# Patient Record
Sex: MALE | Race: OTHER | HISPANIC OR LATINO | URBAN - METROPOLITAN AREA
[De-identification: names, ages, dates, MRNs, and addresses within clinical notes are randomized per-mention and may not be internally consistent; named-entity substitution may affect disease eponyms.]

---

## 2021-02-18 ENCOUNTER — INPATIENT (INPATIENT)
Facility: HOSPITAL | Age: 71
LOS: 3 days | Discharge: HOME | DRG: 841 | End: 2021-02-22
Attending: STUDENT IN AN ORGANIZED HEALTH CARE EDUCATION/TRAINING PROGRAM | Admitting: HOSPITALIST
Payer: MEDICARE

## 2021-02-18 VITALS
DIASTOLIC BLOOD PRESSURE: 90 MMHG | RESPIRATION RATE: 16 BRPM | TEMPERATURE: 98 F | HEART RATE: 75 BPM | OXYGEN SATURATION: 98 % | WEIGHT: 197.98 LBS | HEIGHT: 68 IN | SYSTOLIC BLOOD PRESSURE: 148 MMHG

## 2021-02-18 DIAGNOSIS — I10 ESSENTIAL (PRIMARY) HYPERTENSION: ICD-10-CM

## 2021-02-18 DIAGNOSIS — J98.59 OTHER DISEASES OF MEDIASTINUM, NOT ELSEWHERE CLASSIFIED: ICD-10-CM

## 2021-02-18 DIAGNOSIS — Z90.79 ACQUIRED ABSENCE OF OTHER GENITAL ORGAN(S): Chronic | ICD-10-CM

## 2021-02-18 DIAGNOSIS — D64.9 ANEMIA, UNSPECIFIED: ICD-10-CM

## 2021-02-18 DIAGNOSIS — I31.3 PERICARDIAL EFFUSION (NONINFLAMMATORY): ICD-10-CM

## 2021-02-18 DIAGNOSIS — E11.9 TYPE 2 DIABETES MELLITUS WITHOUT COMPLICATIONS: ICD-10-CM

## 2021-02-18 DIAGNOSIS — R63.8 OTHER SYMPTOMS AND SIGNS CONCERNING FOOD AND FLUID INTAKE: ICD-10-CM

## 2021-02-18 DIAGNOSIS — E78.00 PURE HYPERCHOLESTEROLEMIA, UNSPECIFIED: ICD-10-CM

## 2021-02-18 DIAGNOSIS — C61 MALIGNANT NEOPLASM OF PROSTATE: ICD-10-CM

## 2021-02-18 LAB
ALBUMIN SERPL ELPH-MCNC: 3.8 G/DL — SIGNIFICANT CHANGE UP (ref 3.3–5)
ALP SERPL-CCNC: 61 U/L — SIGNIFICANT CHANGE UP (ref 40–120)
ALT FLD-CCNC: 10 U/L — SIGNIFICANT CHANGE UP (ref 10–45)
ANION GAP SERPL CALC-SCNC: 14 MMOL/L — SIGNIFICANT CHANGE UP (ref 5–17)
APTT BLD: 28.8 SEC — SIGNIFICANT CHANGE UP (ref 27.5–35.5)
AST SERPL-CCNC: 12 U/L — SIGNIFICANT CHANGE UP (ref 10–40)
BASOPHILS # BLD AUTO: 0.04 K/UL — SIGNIFICANT CHANGE UP (ref 0–0.2)
BASOPHILS NFR BLD AUTO: 0.5 % — SIGNIFICANT CHANGE UP (ref 0–2)
BILIRUB SERPL-MCNC: 0.2 MG/DL — SIGNIFICANT CHANGE UP (ref 0.2–1.2)
BUN SERPL-MCNC: 14 MG/DL — SIGNIFICANT CHANGE UP (ref 7–23)
CALCIUM SERPL-MCNC: 9.3 MG/DL — SIGNIFICANT CHANGE UP (ref 8.4–10.5)
CHLORIDE SERPL-SCNC: 109 MMOL/L — HIGH (ref 96–108)
CO2 SERPL-SCNC: 21 MMOL/L — LOW (ref 22–31)
CREAT SERPL-MCNC: 0.76 MG/DL — SIGNIFICANT CHANGE UP (ref 0.5–1.3)
EOSINOPHIL # BLD AUTO: 0.83 K/UL — HIGH (ref 0–0.5)
EOSINOPHIL NFR BLD AUTO: 10.1 % — HIGH (ref 0–6)
GLUCOSE BLDC GLUCOMTR-MCNC: 93 MG/DL — SIGNIFICANT CHANGE UP (ref 70–99)
GLUCOSE SERPL-MCNC: 80 MG/DL — SIGNIFICANT CHANGE UP (ref 70–99)
HCT VFR BLD CALC: 33.7 % — LOW (ref 39–50)
HGB BLD-MCNC: 10.7 G/DL — LOW (ref 13–17)
IMM GRANULOCYTES NFR BLD AUTO: 0.6 % — SIGNIFICANT CHANGE UP (ref 0–1.5)
INR BLD: 1.21 — HIGH (ref 0.88–1.16)
LYMPHOCYTES # BLD AUTO: 1.05 K/UL — SIGNIFICANT CHANGE UP (ref 1–3.3)
LYMPHOCYTES # BLD AUTO: 12.7 % — LOW (ref 13–44)
MCHC RBC-ENTMCNC: 26.7 PG — LOW (ref 27–34)
MCHC RBC-ENTMCNC: 31.8 GM/DL — LOW (ref 32–36)
MCV RBC AUTO: 84 FL — SIGNIFICANT CHANGE UP (ref 80–100)
MONOCYTES # BLD AUTO: 0.82 K/UL — SIGNIFICANT CHANGE UP (ref 0–0.9)
MONOCYTES NFR BLD AUTO: 10 % — SIGNIFICANT CHANGE UP (ref 2–14)
NEUTROPHILS # BLD AUTO: 5.45 K/UL — SIGNIFICANT CHANGE UP (ref 1.8–7.4)
NEUTROPHILS NFR BLD AUTO: 66.1 % — SIGNIFICANT CHANGE UP (ref 43–77)
NRBC # BLD: 0 /100 WBCS — SIGNIFICANT CHANGE UP (ref 0–0)
PLATELET # BLD AUTO: 370 K/UL — SIGNIFICANT CHANGE UP (ref 150–400)
POTASSIUM SERPL-MCNC: 4 MMOL/L — SIGNIFICANT CHANGE UP (ref 3.5–5.3)
POTASSIUM SERPL-SCNC: 4 MMOL/L — SIGNIFICANT CHANGE UP (ref 3.5–5.3)
PROT SERPL-MCNC: 7.2 G/DL — SIGNIFICANT CHANGE UP (ref 6–8.3)
PROTHROM AB SERPL-ACNC: 14.4 SEC — HIGH (ref 10.6–13.6)
RBC # BLD: 4.01 M/UL — LOW (ref 4.2–5.8)
RBC # FLD: 16.7 % — HIGH (ref 10.3–14.5)
SARS-COV-2 RNA SPEC QL NAA+PROBE: SIGNIFICANT CHANGE UP
SODIUM SERPL-SCNC: 144 MMOL/L — SIGNIFICANT CHANGE UP (ref 135–145)
TROPONIN T SERPL-MCNC: <0.01 NG/ML — SIGNIFICANT CHANGE UP (ref 0–0.01)
WBC # BLD: 8.24 K/UL — SIGNIFICANT CHANGE UP (ref 3.8–10.5)
WBC # FLD AUTO: 8.24 K/UL — SIGNIFICANT CHANGE UP (ref 3.8–10.5)

## 2021-02-18 PROCEDURE — G1004: CPT

## 2021-02-18 PROCEDURE — 93010 ELECTROCARDIOGRAM REPORT: CPT

## 2021-02-18 PROCEDURE — 71045 X-RAY EXAM CHEST 1 VIEW: CPT | Mod: 26

## 2021-02-18 PROCEDURE — 71260 CT THORAX DX C+: CPT | Mod: 26,ME

## 2021-02-18 PROCEDURE — 75574 CT ANGIO HRT W/3D IMAGE: CPT | Mod: 26,ME

## 2021-02-18 PROCEDURE — 99223 1ST HOSP IP/OBS HIGH 75: CPT | Mod: GC

## 2021-02-18 PROCEDURE — 99285 EMERGENCY DEPT VISIT HI MDM: CPT | Mod: 25

## 2021-02-18 RX ORDER — ACETAMINOPHEN 500 MG
650 TABLET ORAL EVERY 6 HOURS
Refills: 0 | Status: DISCONTINUED | OUTPATIENT
Start: 2021-02-18 | End: 2021-02-22

## 2021-02-18 RX ORDER — OXYCODONE HYDROCHLORIDE 5 MG/1
5 TABLET ORAL EVERY 4 HOURS
Refills: 0 | Status: DISCONTINUED | OUTPATIENT
Start: 2021-02-18 | End: 2021-02-22

## 2021-02-18 RX ORDER — HYDROMORPHONE HYDROCHLORIDE 2 MG/ML
1 INJECTION INTRAMUSCULAR; INTRAVENOUS; SUBCUTANEOUS EVERY 4 HOURS
Refills: 0 | Status: DISCONTINUED | OUTPATIENT
Start: 2021-02-18 | End: 2021-02-18

## 2021-02-18 RX ORDER — ASPIRIN/CALCIUM CARB/MAGNESIUM 324 MG
162 TABLET ORAL ONCE
Refills: 0 | Status: DISCONTINUED | OUTPATIENT
Start: 2021-02-18 | End: 2021-02-18

## 2021-02-18 RX ORDER — INSULIN LISPRO 100/ML
VIAL (ML) SUBCUTANEOUS
Refills: 0 | Status: DISCONTINUED | OUTPATIENT
Start: 2021-02-18 | End: 2021-02-22

## 2021-02-18 RX ORDER — ATORVASTATIN CALCIUM 80 MG/1
20 TABLET, FILM COATED ORAL AT BEDTIME
Refills: 0 | Status: DISCONTINUED | OUTPATIENT
Start: 2021-02-18 | End: 2021-02-22

## 2021-02-18 RX ORDER — FLUTICASONE PROPIONATE 50 MCG
1 SPRAY, SUSPENSION NASAL
Refills: 0 | Status: DISCONTINUED | OUTPATIENT
Start: 2021-02-18 | End: 2021-02-22

## 2021-02-18 RX ORDER — METOPROLOL TARTRATE 50 MG
50 TABLET ORAL ONCE
Refills: 0 | Status: DISCONTINUED | OUTPATIENT
Start: 2021-02-18 | End: 2021-02-18

## 2021-02-18 RX ORDER — OXYCODONE HYDROCHLORIDE 5 MG/1
10 TABLET ORAL EVERY 6 HOURS
Refills: 0 | Status: DISCONTINUED | OUTPATIENT
Start: 2021-02-18 | End: 2021-02-22

## 2021-02-18 RX ORDER — FAMOTIDINE 10 MG/ML
20 INJECTION INTRAVENOUS ONCE
Refills: 0 | Status: COMPLETED | OUTPATIENT
Start: 2021-02-18 | End: 2021-02-18

## 2021-02-18 RX ORDER — ENOXAPARIN SODIUM 100 MG/ML
40 INJECTION SUBCUTANEOUS AT BEDTIME
Refills: 0 | Status: DISCONTINUED | OUTPATIENT
Start: 2021-02-19 | End: 2021-02-22

## 2021-02-18 RX ORDER — DEXTROSE 50 % IN WATER 50 %
25 SYRINGE (ML) INTRAVENOUS ONCE
Refills: 0 | Status: DISCONTINUED | OUTPATIENT
Start: 2021-02-18 | End: 2021-02-22

## 2021-02-18 RX ORDER — ACETAMINOPHEN 500 MG
650 TABLET ORAL EVERY 6 HOURS
Refills: 0 | Status: DISCONTINUED | OUTPATIENT
Start: 2021-02-18 | End: 2021-02-18

## 2021-02-18 RX ADMIN — ATORVASTATIN CALCIUM 20 MILLIGRAM(S): 80 TABLET, FILM COATED ORAL at 21:49

## 2021-02-18 RX ADMIN — Medication 30 MILLILITER(S): at 14:51

## 2021-02-18 RX ADMIN — FAMOTIDINE 20 MILLIGRAM(S): 10 INJECTION INTRAVENOUS at 14:51

## 2021-02-18 NOTE — ED ADULT NURSE NOTE - OBJECTIVE STATEMENT
Pt AOX4. Pt c/o intermittent midsternal chest pain for 2 and a half weeks. Pt states "this morning I woke up and the chest pain felt different it feels sharp and burning". Pt denies SOB, fevers, chills, n/v, weakness, numbness. Pt speaking in full complete sentences. Respirations even and unlabored.

## 2021-02-18 NOTE — H&P ADULT - ASSESSMENT
Mr. Rosen is a 71 year old male with a past medical history of DM, HTN, HLD, Prostate Ca who presents with chest pain. He was admitted for work up of mediastinal mass.

## 2021-02-18 NOTE — H&P ADULT - PROBLEM SELECTOR PLAN 1
2 week history of pain without relief. CXR showed widened mediastinum. CT chest showed a large mass encasing the jose, right mainstem bronchus and abutting mediastinal vessels and mild to moderate pericardial effusion 2 week history of pain without relief. CXR showed widened mediastinum. CT chest showed a large mass encasing the jose, right mainstem bronchus and abutting mediastinal vessels and mild to moderate pericardial effusion  - Pulmonology made aware of mediastinal mass and plans to asses in AM  - Pending Bronchoscopy and biopsy on mass this admission  - Monitor for signs of cardiac tamponade. Currently HD stable  - F/U Pulmonology recommendations 2 week history of pain without relief. EKG NSR. Troponin negative. CXR showed widened mediastinum. CT chest showed a large mass encasing the jose, right mainstem bronchus and abutting mediastinal vessels and mild to moderate pericardial effusion  - Pulmonology made aware of mediastinal mass and plans to asses in AM  - Pending Bronchoscopy and biopsy on mass this admission  - Monitor for signs of cardiac tamponade. Currently HD stable  - F/U Pulmonology recommendations 2 week history of pain without relief. EKG NSR. Troponin negative. CXR showed widened mediastinum. CT chest showed a large mass encasing the jose, right mainstem bronchus and abutting mediastinal vessels and mild to moderate pericardial effusion  - Pulmonology made aware of mediastinal mass and plans to asses in AM  - Pending Bronchoscopy and biopsy on mass this admission. Need histologic differentiation to guide treatment course  - May require CT surgery evaluation for mass  - Monitor for signs of cardiac tamponade. Currently HD stable  - F/U Pulmonology recommendations

## 2021-02-18 NOTE — ED ADULT NURSE NOTE - MUSCULOSKELETAL ASSESSMENT
requesting medication refill. Please approve or deny this request.    Rx requested:  Requested Prescriptions     Pending Prescriptions Disp Refills    nitroGLYCERIN (NITROSTAT) 0.4 MG SL tablet [Pharmacy Med Name: Nitroglycerin 0.4 MG Sublingual Tablet Sublingual] 25 tablet 0     Sig: DISSOLVE ONE TABLET UNDER THE TONGUE EVERY 5 MINUTES AS NEEDED FOR CHEST PAIN. DO NOT EXCEED A TOTAL OF 3 DOSES IN 15 MINUTES         Last Office Visit:   9/19/2019      Next Visit Date:  No future appointments.
- - -

## 2021-02-18 NOTE — H&P ADULT - PROBLEM SELECTOR PLAN 4
Hgb 10.7 on initial CBC, unknown baseline Hgb. Denies any dark or bloody bowel movements  - F/U Iron studies in AM  - Continue to monitor, maintain active T & S Takes Metformin 500 mg BID  - SSI while inpatient  - F/U A1c in AM

## 2021-02-18 NOTE — ED PROVIDER NOTE - CLINICAL SUMMARY MEDICAL DECISION MAKING FREE TEXT BOX
here with substernal chest discomfort for 2 weeks. non exertional, no associated symptoms. no fever. ecg nsr. given age/risk factors, sent for cta coronaries to r/o cardiac disease and ct chest ordered to eval abnormal mediastinal contour on cxr. no significant cad noted on cardiac scan but large mediastinal soft tissue mass seen on ct chest. pulmonary consulted, scan reviewed by fellow Dr. Chung. plan admit to medicine to facilitate workup/ possible biopsy, eval by [facility], possible ct surgery if needed. pt clinically stable without respiratory symptoms.

## 2021-02-18 NOTE — H&P ADULT - NSICDXPASTMEDICALHX_GEN_ALL_CORE_FT
PAST MEDICAL HISTORY:  Diabetes     High cholesterol     HTN (hypertension)     Prostate cancer

## 2021-02-18 NOTE — H&P ADULT - NSICDXFAMILYHX_GEN_ALL_CORE_FT
FAMILY HISTORY:  Family history of diabetes mellitus (DM)  Family history of hyperlipidemia  FH: HTN (hypertension)

## 2021-02-18 NOTE — H&P ADULT - PROBLEM SELECTOR PLAN 8
F: None  E: Replete PRN  N: F: None  E: Replete PRN  N: DASH/CC    DVT: Lovenox  GI: None ADDENDUM: pt deferred nicotine patch o/n

## 2021-02-18 NOTE — H&P ADULT - PROBLEM SELECTOR PLAN 5
Continue home Lipitor 20 mg qhs Hgb 10.7 on initial CBC, unknown baseline Hgb. Denies any dark or bloody bowel movements  - F/U Iron studies in AM  - Continue to monitor, maintain active T & S

## 2021-02-18 NOTE — H&P ADULT - PROBLEM SELECTOR PLAN 3
History of Prostatectomy in ~2016 with a "little" chemotherapy. Denies radiation treatment.  - History of malignancy in setting of pericardial effusion and mediastinal mass raises concern for recurrence of cancer  - Obtain collateral on treatment from PCP, Dr. Peres

## 2021-02-18 NOTE — H&P ADULT - ATTENDING COMMENTS
reviewed pertinent data , h&p  patient seen and examined overnight   PE findings as above, in NAD looks younger than stated age. s1s2, looks younger than stated age  1. Mediastinal mass: followup pulm recs, NPO o/n pending evaluation   2. followup TTE, no elecrical alternans on ekg, cardiology consult pending TTE       rest of  plan as above

## 2021-02-18 NOTE — H&P ADULT - NSHPPHYSICALEXAM_GEN_ALL_CORE
VITAL SIGNS:  T(C): 36.9 (02-18-21 @ 19:11), Max: 37.2 (02-18-21 @ 15:59)  T(F): 98.5 (02-18-21 @ 19:11), Max: 98.9 (02-18-21 @ 15:59)  HR: 58 (02-18-21 @ 19:11) (57 - 86)  BP: 116/62 (02-18-21 @ 19:11) (101/57 - 160/89)  RR: 17 (02-18-21 @ 19:11) (16 - 18)  SpO2: 98% (02-18-21 @ 19:11) (97% - 99%)    PHYSICAL EXAM:    Constitutional: WDWN, lying comfortably in bed, NAD  HEENT: NCAT, no LAD, no JVD, EOMI  Respiratory: CTAB, no increased work of breathing  Cardiac: RRR, normal S1/S2, no murmurs appreciated   Gastrointestinal: soft, non-tender, non-distended, no rebound/guarding, no palpable masses, normoactive bowel sounds x4  Extremities: WWP, no clubbing or cyanosis, no peripheral edema  Musculoskeletal: NROM x4; no joint swelling, tenderness or erythema  Vascular: 2+ radial and DP pulses b/l  Dermatologic: skin warm, dry and intact, no rashes, wounds, or scars  Lymphatic: no submandibular or cervical LAD  Neurologic: AAOx3, CNII-XII grossly intact, no focal deficits  Psychiatric: affect and characteristics of appearance, verbalizations, behaviors are appropriate

## 2021-02-18 NOTE — H&P ADULT - NSHPSOCIALHISTORY_GEN_ALL_CORE
Former alcohol use, 25 pack year smoking history, denies illicit drug use. Lives at home with his wife. Retired government worker.

## 2021-02-18 NOTE — H&P ADULT - NSHPLABSRESULTS_GEN_ALL_CORE
LABS:                         10.7   8.24  )-----------( 370      ( 18 Feb 2021 14:35 )             33.7     02-18    144  |  109<H>  |  14  ----------------------------<  80  4.0   |  21<L>  |  0.76    Ca    9.3      18 Feb 2021 14:35    TPro  7.2  /  Alb  3.8  /  TBili  0.2  /  DBili  x   /  AST  12  /  ALT  10  /  AlkPhos  61  02-18    PT/INR - ( 18 Feb 2021 14:35 )   PT: 14.4 sec;   INR: 1.21          PTT - ( 18 Feb 2021 14:35 )  PTT:28.8 sec    CARDIAC MARKERS ( 18 Feb 2021 14:35 )  x     / <0.01 ng/mL / x     / x     / x                RADIOLOGY, EKG & ADDITIONAL TESTS: Reviewed.

## 2021-02-18 NOTE — ED PROVIDER NOTE - OBJECTIVE STATEMENT
history of dm, htn, high cholesterol, prostate cancer here with intermittent chest pain for past 2 weeks. Substernal, non radiating. Unable to describe quality other than saying it feels like heartburn but isn't heartburn because he tried otc medicine without relief. Non exertional, denies sob, fever/chills, nausea, vomiting. Prior smoker. Went to his pmd clinic today and referred to ED for further treatment. Given asa 81mg pta. Also reportedly had cxr with mass, concerning for cancer. history of dm, htn, high cholesterol, prostate cancer here with intermittent chest pain for past 2 weeks. Substernal, non radiating. Unable to describe quality other than saying it feels like heartburn but isn't heartburn because he tried otc medicine without relief. Non exertional, denies sob, fever/chills, nausea, vomiting. Prior smoker. Went to his pmd clinic today and referred to ED for further treatment. Given asa 324 mg pta. Also reportedly had cxr with mass, concerning for cancer.

## 2021-02-18 NOTE — H&P ADULT - HISTORY OF PRESENT ILLNESS
Mr. Rosen is a 71 year old male with a past medical history of DM, HTN, HLD, Prostate Ca who presents with chest pain. Mr. Rosen states he has experienced chest pain for the past 2 weeks. He describes the pain as similar to heartburn, general discomfort. It is substernal and nonradiating. He attempted to take over the counter antacids without relief. He denies any exertional component to the pain, cough or SOB. He went to his PCP today and instructed him to report to the ED after being given 324 mg of ASA. He denies any fevers/chills, changes in bowel or urinary habits.    In the ED, he was afebrile (98.4), HR 75, /90, RR 16, and saturating 98% on room air. Labs were significant Hgb 10.7, INR 1.21, and HCO3 21. CXR showed a cardiomegaly and a widened mediastinum. CT angio cardiac showed a large heterogenous mass visualized at portions of lower trachea, proximal bronchial airways and right interlobar bronchus and a moderate pericardial effusion. CT chest with contrast showed a large mass encasing the jose, right mainstem bronchus and abutting mediastinal vessels. He was given maalox and famotidine for chest discomfort. Pulmonology was contacted regarding mediastinal mass. He was admitted for work up of mediastinal mass.  Mr. Rosen is a 71 year old male with a past medical history of DM, HTN, HLD, Prostate Ca who presents with chest pain. Mr. Rosen states he has experienced chest pain for the past 2 weeks. He describes the pain as similar to heartburn, general discomfort. It is substernal and nonradiating. He attempted to take over the counter antacids without relief. He states the pain is worsened by leaning forward and relieves with lying down. He endorses unintentional weight loss over the past month, but denies any night sweats. He denies any exertional component to the pain, cough or SOB. He went to his PCP today and instructed him to report to the ED after being given 324 mg of ASA. He denies any fevers/chills, changes in bowel or urinary habits. He    In the ED, he was afebrile (98.4), HR 75, /90, RR 16, and saturating 98% on room air. Labs were significant Hgb 10.7, INR 1.21, and HCO3 21. CXR showed a cardiomegaly and a widened mediastinum. CT angio cardiac showed a large heterogenous mass visualized at portions of lower trachea, proximal bronchial airways and right interlobar bronchus and a moderate pericardial effusion. CT chest with contrast showed a large mass encasing the jose, right mainstem bronchus and abutting mediastinal vessels. He was given maalox and famotidine for chest discomfort. Pulmonology was contacted regarding mediastinal mass. He was admitted for work up of mediastinal mass.  Mr. Rosen is a 71 year old male with a past medical history of DM, HTN, HLD, Prostate Ca who presents with chest pain. Mr. Rosen states he has experienced chest pain for the past 2 weeks. He describes the pain as similar to heartburn, general discomfort. It is substernal and nonradiating. He attempted to take over the counter antacids without relief. He states the pain is worsened by leaning forward and relieves with lying down. He endorses unintentional weight loss over the past month, but denies any night sweats. He denies any exertional component to the pain, cough or SOB. He went to his PCP today and instructed him to report to the ED after being given 324 mg of ASA. He denies any fevers/chills, changes in bowel or urinary habits. He    In the ED, he was afebrile (98.4), HR 75, /90, RR 16, and saturating 98% on room air. Labs were significant Hgb 10.7, INR 1.21, and HCO3 21. Troponin were negative on arrival and EKG showed normal sinus rhythm. CXR showed a cardiomegaly and a widened mediastinum. CT angio cardiac showed a large heterogenous mass visualized at portions of lower trachea, proximal bronchial airways and right interlobar bronchus and a moderate pericardial effusion. CT chest with contrast showed a large mass encasing the jose, right mainstem bronchus and abutting mediastinal vessels. He was given maalox and famotidine for chest discomfort. Pulmonology was contacted regarding mediastinal mass. He was admitted for work up of mediastinal mass.  Mr. Rosen is a 71 year old male with a past medical history of DM, HTN, HLD, Prostate Ca who presents with chest pain. Mr. Rosen states he has experienced chest pain for the past 2 weeks. He describes the pain as similar to heartburn, general discomfort. It is substernal and nonradiating. He attempted to take over the counter antacids without relief. He states the pain is worsened by leaning forward and relieves with lying down. He endorses unintentional weight loss over the past month, but denies any night sweats. He denies any exertional component to the pain, cough or SOB. He went to his PCP today and instructed him to report to the ED after being given 324 mg of ASA. He denies any fevers/chills, changes in bowel or urinary habits.     In the ED, he was afebrile (98.4), HR 75, /90, RR 16, and saturating 98% on room air. Labs were significant Hgb 10.7, INR 1.21, and HCO3 21. Troponin were negative on arrival and EKG showed normal sinus rhythm. CXR showed a cardiomegaly and a widened mediastinum. CT angio cardiac showed a large heterogenous mass visualized at portions of lower trachea, proximal bronchial airways and right interlobar bronchus and a moderate pericardial effusion. CT chest with contrast showed a large mass encasing the jose, right mainstem bronchus and abutting mediastinal vessels. He was given maalox and famotidine for chest discomfort. Pulmonology was contacted regarding mediastinal mass. He was admitted for work up of mediastinal mass.

## 2021-02-18 NOTE — H&P ADULT - PROBLEM SELECTOR PLAN 2
Seen on CT chest and CT cardiac angio as above  - Not exhibiting any signs of cardiac tamponade and hemodynamically stable  - 0/3 components of [place] (no JVD, hypotension, muffled heart sounds)  - TTE ordered for AM to evaluate effusion and need for drain or pericardiocentesis  - Obtain STAT cardiology consult if hemodynamically unstable

## 2021-02-19 ENCOUNTER — RESULT REVIEW (OUTPATIENT)
Age: 71
End: 2021-02-19

## 2021-02-19 DIAGNOSIS — Z72.0 TOBACCO USE: ICD-10-CM

## 2021-02-19 LAB
A1C WITH ESTIMATED AVERAGE GLUCOSE RESULT: 6 % — HIGH (ref 4–5.6)
ANION GAP SERPL CALC-SCNC: 11 MMOL/L — SIGNIFICANT CHANGE UP (ref 5–17)
BASOPHILS # BLD AUTO: 0.05 K/UL — SIGNIFICANT CHANGE UP (ref 0–0.2)
BASOPHILS NFR BLD AUTO: 0.7 % — SIGNIFICANT CHANGE UP (ref 0–2)
BUN SERPL-MCNC: 12 MG/DL — SIGNIFICANT CHANGE UP (ref 7–23)
CALCIUM SERPL-MCNC: 8.7 MG/DL — SIGNIFICANT CHANGE UP (ref 8.4–10.5)
CHLORIDE SERPL-SCNC: 102 MMOL/L — SIGNIFICANT CHANGE UP (ref 96–108)
CO2 SERPL-SCNC: 23 MMOL/L — SIGNIFICANT CHANGE UP (ref 22–31)
CREAT SERPL-MCNC: 0.78 MG/DL — SIGNIFICANT CHANGE UP (ref 0.5–1.3)
EOSINOPHIL # BLD AUTO: 0.44 K/UL — SIGNIFICANT CHANGE UP (ref 0–0.5)
EOSINOPHIL NFR BLD AUTO: 6.1 % — HIGH (ref 0–6)
ESTIMATED AVERAGE GLUCOSE: 126 MG/DL — HIGH (ref 68–114)
FERRITIN SERPL-MCNC: 211 NG/ML — SIGNIFICANT CHANGE UP (ref 30–400)
GLUCOSE BLDC GLUCOMTR-MCNC: 114 MG/DL — HIGH (ref 70–99)
GLUCOSE BLDC GLUCOMTR-MCNC: 118 MG/DL — HIGH (ref 70–99)
GLUCOSE BLDC GLUCOMTR-MCNC: 282 MG/DL — HIGH (ref 70–99)
GLUCOSE BLDC GLUCOMTR-MCNC: 86 MG/DL — SIGNIFICANT CHANGE UP (ref 70–99)
GLUCOSE SERPL-MCNC: 98 MG/DL — SIGNIFICANT CHANGE UP (ref 70–99)
GRAM STN FLD: SIGNIFICANT CHANGE UP
HCT VFR BLD CALC: 34 % — LOW (ref 39–50)
HCV AB S/CO SERPL IA: 0.08 S/CO — SIGNIFICANT CHANGE UP
HCV AB SERPL-IMP: SIGNIFICANT CHANGE UP
HGB BLD-MCNC: 10.6 G/DL — LOW (ref 13–17)
IMM GRANULOCYTES NFR BLD AUTO: 0.4 % — SIGNIFICANT CHANGE UP (ref 0–1.5)
IRON SATN MFR SERPL: 12 UG/DL — LOW (ref 45–165)
IRON SATN MFR SERPL: 5 % — LOW (ref 16–55)
LYMPHOCYTES # BLD AUTO: 0.61 K/UL — LOW (ref 1–3.3)
LYMPHOCYTES # BLD AUTO: 8.4 % — LOW (ref 13–44)
MAGNESIUM SERPL-MCNC: 1.8 MG/DL — SIGNIFICANT CHANGE UP (ref 1.6–2.6)
MCHC RBC-ENTMCNC: 26.8 PG — LOW (ref 27–34)
MCHC RBC-ENTMCNC: 31.2 GM/DL — LOW (ref 32–36)
MCV RBC AUTO: 86.1 FL — SIGNIFICANT CHANGE UP (ref 80–100)
MONOCYTES # BLD AUTO: 1 K/UL — HIGH (ref 0–0.9)
MONOCYTES NFR BLD AUTO: 13.8 % — SIGNIFICANT CHANGE UP (ref 2–14)
NEUTROPHILS # BLD AUTO: 5.12 K/UL — SIGNIFICANT CHANGE UP (ref 1.8–7.4)
NEUTROPHILS NFR BLD AUTO: 70.6 % — SIGNIFICANT CHANGE UP (ref 43–77)
NRBC # BLD: 0 /100 WBCS — SIGNIFICANT CHANGE UP (ref 0–0)
PLATELET # BLD AUTO: 328 K/UL — SIGNIFICANT CHANGE UP (ref 150–400)
POTASSIUM SERPL-MCNC: 3.8 MMOL/L — SIGNIFICANT CHANGE UP (ref 3.5–5.3)
POTASSIUM SERPL-SCNC: 3.8 MMOL/L — SIGNIFICANT CHANGE UP (ref 3.5–5.3)
RBC # BLD: 3.95 M/UL — LOW (ref 4.2–5.8)
RBC # FLD: 16.9 % — HIGH (ref 10.3–14.5)
SODIUM SERPL-SCNC: 136 MMOL/L — SIGNIFICANT CHANGE UP (ref 135–145)
SPECIMEN SOURCE: SIGNIFICANT CHANGE UP
T4 AB SER-ACNC: 6.21 UG/DL — SIGNIFICANT CHANGE UP (ref 3.17–11.72)
TIBC SERPL-MCNC: 240 UG/DL — SIGNIFICANT CHANGE UP (ref 220–430)
TRANSFERRIN SERPL-MCNC: 197 MG/DL — LOW (ref 200–360)
TSH SERPL-MCNC: 1.18 UIU/ML — SIGNIFICANT CHANGE UP (ref 0.35–4.94)
UIBC SERPL-MCNC: 228 UG/DL — SIGNIFICANT CHANGE UP (ref 110–370)
WBC # BLD: 7.25 K/UL — SIGNIFICANT CHANGE UP (ref 3.8–10.5)
WBC # FLD AUTO: 7.25 K/UL — SIGNIFICANT CHANGE UP (ref 3.8–10.5)

## 2021-02-19 PROCEDURE — 99223 1ST HOSP IP/OBS HIGH 75: CPT

## 2021-02-19 PROCEDURE — 93306 TTE W/DOPPLER COMPLETE: CPT | Mod: 26

## 2021-02-19 PROCEDURE — 99233 SBSQ HOSP IP/OBS HIGH 50: CPT | Mod: GC

## 2021-02-19 PROCEDURE — 88342 IMHCHEM/IMCYTCHM 1ST ANTB: CPT | Mod: 26,59

## 2021-02-19 PROCEDURE — 99223 1ST HOSP IP/OBS HIGH 75: CPT | Mod: 25

## 2021-02-19 PROCEDURE — 88344 IMHCHEM/IMCYTCHM EA MLT ANTB: CPT | Mod: 26,59

## 2021-02-19 PROCEDURE — 31652 BRONCH EBUS SAMPLNG 1/2 NODE: CPT | Mod: GC

## 2021-02-19 PROCEDURE — 88360 TUMOR IMMUNOHISTOCHEM/MANUAL: CPT | Mod: 26

## 2021-02-19 PROCEDURE — 88305 TISSUE EXAM BY PATHOLOGIST: CPT | Mod: 26

## 2021-02-19 PROCEDURE — 88341 IMHCHEM/IMCYTCHM EA ADD ANTB: CPT | Mod: 26,59

## 2021-02-19 PROCEDURE — 43235 EGD DIAGNOSTIC BRUSH WASH: CPT

## 2021-02-19 PROCEDURE — 88173 CYTOPATH EVAL FNA REPORT: CPT | Mod: 26

## 2021-02-19 RX ORDER — POTASSIUM CHLORIDE 20 MEQ
10 PACKET (EA) ORAL
Refills: 0 | Status: COMPLETED | OUTPATIENT
Start: 2021-02-19 | End: 2021-02-19

## 2021-02-19 RX ORDER — ATORVASTATIN CALCIUM 80 MG/1
1 TABLET, FILM COATED ORAL
Qty: 0 | Refills: 0 | DISCHARGE

## 2021-02-19 RX ORDER — METFORMIN HYDROCHLORIDE 850 MG/1
1 TABLET ORAL
Qty: 0 | Refills: 0 | DISCHARGE

## 2021-02-19 RX ORDER — MAGNESIUM SULFATE 500 MG/ML
2 VIAL (ML) INJECTION ONCE
Refills: 0 | Status: COMPLETED | OUTPATIENT
Start: 2021-02-19 | End: 2021-02-19

## 2021-02-19 RX ADMIN — Medication 3: at 22:23

## 2021-02-19 RX ADMIN — Medication 1 SPRAY(S): at 06:07

## 2021-02-19 RX ADMIN — Medication 100 MILLIEQUIVALENT(S): at 10:58

## 2021-02-19 RX ADMIN — ENOXAPARIN SODIUM 40 MILLIGRAM(S): 100 INJECTION SUBCUTANEOUS at 22:24

## 2021-02-19 RX ADMIN — ATORVASTATIN CALCIUM 20 MILLIGRAM(S): 80 TABLET, FILM COATED ORAL at 22:24

## 2021-02-19 RX ADMIN — Medication 1 SPRAY(S): at 18:09

## 2021-02-19 RX ADMIN — Medication 650 MILLIGRAM(S): at 06:58

## 2021-02-19 RX ADMIN — Medication 50 GRAM(S): at 18:11

## 2021-02-19 RX ADMIN — Medication 100 MILLIEQUIVALENT(S): at 16:35

## 2021-02-19 NOTE — PROGRESS NOTE ADULT - PROBLEM SELECTOR PLAN 2
Seen on CT chest and CT cardiac angio as above  - Not exhibiting any signs of cardiac tamponade and hemodynamically stable  - 0/3 components of [place] (no JVD, hypotension, muffled heart sounds)  - TTE ordered for AM to evaluate effusion and need for drain or pericardiocentesis  - Obtain STAT cardiology consult if hemodynamically unstable Seen on CT chest and CT cardiac angio as above  - Not exhibiting any signs of cardiac tamponade and hemodynamically stable  - 0/3 components of [place] (no JVD, hypotension, muffled heart sounds)  - TTE ordered for 2/19 to evaluate effusion and need for drain or pericardiocentesis (still pending)  - Obtain STAT cardiology consult if hemodynamically unstable

## 2021-02-19 NOTE — CONSULT NOTE ADULT - SUBJECTIVE AND OBJECTIVE BOX
HPI:  Mr. Rosen is a 71 year old male with a past medical history of DM, HTN, HLD, Prostate Ca who presents with chest pain. Mr. Rosen states he has experienced chest pain for the past 2 weeks. He describes the pain as similar to heartburn, general discomfort. It is substernal and nonradiating. He attempted to take over the counter antacids without relief. He states the pain is worsened by leaning forward and relieves with lying down. He endorses unintentional weight loss over the past month, but denies any night sweats. He denies any exertional component to the pain, cough or SOB. He went to his PCP today and instructed him to report to the ED after being given 324 mg of ASA. He denies any fevers/chills, changes in bowel or urinary habits. CXR showed a cardiomegaly and a widened mediastinum. CT angio cardiac showed a large heterogenous mass visualized at portions of lower trachea, proximal bronchial airways and right interlobar bronchus and a moderate pericardial effusion. CT chest with contrast showed a large mass encasing the jose, right mainstem bronchus and abutting mediastinal vessels. He was given maalox and famotidine for chest discomfort. Pulmonology was contacted regarding mediastinal mass. He was admitted for work up of mediastinal mass.   Echo was performed which shows mall circumferential pericardial effusion with a moderate to large collection adajcent to the right atrium, with systolic invagination of the right atrium, equivocal diastolic invagination of the right ventricle, no significant respiratory changes in mitral inflow velocities, with collapsing IVC. No true signs of echocardiographic tamponade.     ROS: A 10-point review of systems was otherwise negative.    PAST MEDICAL & SURGICAL HISTORY:  Prostate cancer    High cholesterol    HTN (hypertension)    Diabetes    H/O prostatectomy        SOCIAL HISTORY:  FAMILY HISTORY:  Family history of hyperlipidemia    Family history of diabetes mellitus (DM)    FH: HTN (hypertension)        ALLERGIES: 	  No Known Allergies            MEDICATIONS:  acetaminophen   Tablet .. 650 milliGRAM(s) Oral every 6 hours PRN  atorvastatin 20 milliGRAM(s) Oral at bedtime  dextrose 50% Injectable 25 Gram(s) IV Push once  enoxaparin Injectable 40 milliGRAM(s) SubCutaneous at bedtime  fluticasone propionate 50 MICROgram(s)/spray Nasal Spray 1 Spray(s) Both Nostrils two times a day  insulin lispro (ADMELOG) corrective regimen sliding scale   SubCutaneous Before meals and at bedtime  magnesium sulfate  IVPB 2 Gram(s) IV Intermittent once  oxyCODONE    IR 5 milliGRAM(s) Oral every 4 hours PRN  oxyCODONE    IR 10 milliGRAM(s) Oral every 6 hours PRN  potassium chloride  10 mEq/100 mL IVPB 10 milliEquivalent(s) IV Intermittent every 1 hour      PHYSICAL EXAM:  T(C): 36.9 (02-19-21 @ 11:36), Max: 37.8 (02-19-21 @ 06:48)  HR: 62 (02-19-21 @ 11:36) (58 - 78)  BP: 113/73 (02-19-21 @ 11:36) (101/57 - 126/61)  RR: 19 (02-19-21 @ 11:36) (17 - 19)  SpO2: 98% (02-19-21 @ 11:36) (96% - 99%)  Wt(kg): --    GEN: Awake, comfortable. NAD.   HEENT: NCAT, PERRL, EOMI. Mucosa moist. No JVD.   RESP: CTA b/l  CV: RRR, normal s1/s2. No m/r/g.  ABD: Soft, NTND. BS+  EXT: Warm. No edema, clubbing, or cyanosis.   NEURO: AAOx3. No focal deficits.    I&O's Summary      	  LABS:	 	    CARDIAC MARKERS:                                  10.6   7.25  )-----------( 328      ( 19 Feb 2021 08:30 )             34.0     02-19    136  |  102  |  12  ----------------------------<  98  3.8   |  23  |  0.78    Ca    8.7      19 Feb 2021 08:30  Mg     1.8     02-19    TPro  7.2  /  Alb  3.8  /  TBili  0.2  /  DBili  x   /  AST  12  /  ALT  10  /  AlkPhos  61  02-18    proBNP:   Lipid Profile:   HgA1c:   TSH: Thyroid Stimulating Hormone, Serum: 1.180 uIU/mL (02-19 @ 08:30)      	    ECG:  	NSR  RADIOLOGY: < from: CT Angio Cardiac w/ IV Cont (02.18.21 @ 16:50) >  1.  Large heterogenous mass imaged encasing visualized portions of lower trachea, proximal bronchial airways and right interlobar bronchus. Mass is only partially imaged due to technique. Recommend contrast-enhanced CT of chest for complete assessment.  2.  Moderate pericardial effusion.1. Mildly elevated calcium score. ( Calcium score = 40 Agatston units, which is at the 31 percentile, adjusted for age, gender and race).  2. Normal Left Main and RCA.  3. Minimal stenosis in the proximal LAD, proximal LCX and OM1.    < end of copied text >    ECHO: < from: TTE Echo Complete w/o Contrast w/ Doppler (02.19.21 @ 10:10) >  1. No significant valvular disease.   2. Normal left and right ventricular size and systolic function.   3. There is a small circumferential pericardial effusion with a moderate to large collection adajcent to the right atrium. There is systolic invagination of the right atrium. Diastolic invagination of the right ventricle not definitive. No significant respiratory changes in mitral inflow velocities. The inferior vena cava is normal in size (<2.1cm) with normal inspiratory collapse (>50%) consistent with normal right atrial pressure (  3, range 0-5mmHg). Cardiac tamponade is a clinical diagnosis. Clinical correlation required.   4. No prior echo is available for comparison.    < end of copied text >

## 2021-02-19 NOTE — CONSULT NOTE ADULT - SUBJECTIVE AND OBJECTIVE BOX
Incomplete till signed by attending    Mr. Rosen is a 71 year old male with a past medical history of DM, HTN, HLD, Prostate Ca (2016 s/p partial resection with continued follow-up) who presented with two weeks of non-radiating substernal chest pain. The pain is not associated with excertion, but is worse when leaning forward and relieved by lying down. Patient presented to [facility] ED with concerns for ACS, but on CXR and CT imaging showing large mass encasing the jose, right mainstem bronchus and abutting mediastinal vessels concerning for malignancy. 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 Denies significant weight loss, night sweats, fevers, fatigue, cough.      Patient seen and examined at bedside. no complaints right now, just some uncomfortableness with swallowing    OBJECTIVE:    VITAL SIGNS:  ICU Vital Signs Last 24 Hrs  T(C): 37.8 (19 Feb 2021 06:48), Max: 37.8 (19 Feb 2021 06:48)  T(F): 100.1 (19 Feb 2021 06:48), Max: 100.1 (19 Feb 2021 06:48)  HR: 78 (19 Feb 2021 06:48) (57 - 86)  BP: 126/61 (19 Feb 2021 06:48) (101/57 - 160/89)  RR: 19 (19 Feb 2021 06:48) (16 - 19)  SpO2: 98% (19 Feb 2021 06:48) (96% - 99%)        CAPILLARY BLOOD GLUCOSE      POCT Blood Glucose.: 93 mg/dL (18 Feb 2021 22:07)      General: NAD  HEENT: NC/AT; PERRL, clear conjunctiva, no LAD, JVD  Neck: Supple  Respiratory: CTA b/l. No wheezes, rhonchi, rales.  Cardiovascular: +S1/S2; RRR  Abdomen: soft, NT/ND; +BS x4  Extremities: WWP, 2+ peripheral pulses b/l; no LE savi  Skin: normal color and turgor; no rash, no lymphadenopathy  Neurological: AOx3    MEDICATIONS:  MEDICATIONS  (STANDING):  atorvastatin 20 milliGRAM(s) Oral at bedtime  dextrose 50% Injectable 25 Gram(s) IV Push once  enoxaparin Injectable 40 milliGRAM(s) SubCutaneous at bedtime  fluticasone propionate 50 MICROgram(s)/spray Nasal Spray 1 Spray(s) Both Nostrils two times a day  insulin lispro (ADMELOG) corrective regimen sliding scale   SubCutaneous Before meals and at bedtime    MEDICATIONS  (PRN):  acetaminophen   Tablet .. 650 milliGRAM(s) Oral every 6 hours PRN Mild Pain (1 - 3)  oxyCODONE    IR 5 milliGRAM(s) Oral every 4 hours PRN Moderate Pain (4 - 6)  oxyCODONE    IR 10 milliGRAM(s) Oral every 6 hours PRN Severe Pain (7 - 10)      ALLERGIES:  Allergies    No Known Allergies    Intolerances        LABS:                        10.7   8.24  )-----------( 370      ( 18 Feb 2021 14:35 )             33.7     02-18    144  |  109<H>  |  14  ----------------------------<  80  4.0   |  21<L>  |  0.76    Ca    9.3      18 Feb 2021 14:35    TPro  7.2  /  Alb  3.8  /  TBili  0.2  /  DBili  x   /  AST  12  /  ALT  10  /  AlkPhos  61  02-18    PT/INR - ( 18 Feb 2021 14:35 )   PT: 14.4 sec;   INR: 1.21          PTT - ( 18 Feb 2021 14:35 )  PTT:28.8 sec      RADIOLOGY & ADDITIONAL TESTS:   < from: CT Chest w/ IV Cont (02.18.21 @ 17:23) >  1.  Large mass/shelli aggregate encasing the jose, right mainstem bronchus and abutting mediastinal vessels as detailed above. Recommend metabolic and histologic assessment for further characterization.  2.  Small to moderate pericardial effusion.  3.  Mildly dilated aortic root 4.0 cm.  4.  Mildly dilated main pulmonary artery which can be seen in pulmonary artery hypertension.    < end of copied text >  < from: CT Angio Cardiac w/ IV Cont (02.18.21 @ 16:50) >  1.  Large heterogenous mass imaged encasing visualized portions of lower trachea, proximal bronchial airways and right interlobar bronchus. Mass is only partially imaged due to technique. Recommend contrast-enhanced CT of chest for complete assessment.  2.  Moderate pericardial effusion.    < end of copied text >   Incomplete till signed by attending    Mr. Rosen is a 71 year old male with a past medical history of DM, HTN, HLD, Prostate Ca (2016 s/p radical prostatectomy with continued follow-up) who presented with two weeks of non-radiating substernal chest pain. The pain is not associated with excertion, but is worse when leaning forward and relieved by lying down. Patient presented to [facility] ED with concerns for ACS, but on CXR and CT imaging showing large mass encasing the jose, right mainstem bronchus and abutting mediastinal vessels concerning for malignancy. 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 Denies significant weight loss, night sweats, fevers, fatigue, cough.      Patient seen and examined at bedside. no complaints right now, just some uncomfortableness with swallowing    OBJECTIVE:    VITAL SIGNS:  ICU Vital Signs Last 24 Hrs  T(C): 37.8 (19 Feb 2021 06:48), Max: 37.8 (19 Feb 2021 06:48)  T(F): 100.1 (19 Feb 2021 06:48), Max: 100.1 (19 Feb 2021 06:48)  HR: 78 (19 Feb 2021 06:48) (57 - 86)  BP: 126/61 (19 Feb 2021 06:48) (101/57 - 160/89)  RR: 19 (19 Feb 2021 06:48) (16 - 19)  SpO2: 98% (19 Feb 2021 06:48) (96% - 99%)        CAPILLARY BLOOD GLUCOSE      POCT Blood Glucose.: 93 mg/dL (18 Feb 2021 22:07)      General: NAD  HEENT: NC/AT; PERRL, clear conjunctiva, no LAD, JVD  Neck: Supple  Respiratory: CTA b/l. No wheezes, rhonchi, rales.  Cardiovascular: +S1/S2; RRR  Abdomen: soft, NT/ND; +BS x4  Extremities: WWP, 2+ peripheral pulses b/l; no LE savi  Skin: normal color and turgor; no rash, no lymphadenopathy  Neurological: AOx3    MEDICATIONS:  MEDICATIONS  (STANDING):  atorvastatin 20 milliGRAM(s) Oral at bedtime  dextrose 50% Injectable 25 Gram(s) IV Push once  enoxaparin Injectable 40 milliGRAM(s) SubCutaneous at bedtime  fluticasone propionate 50 MICROgram(s)/spray Nasal Spray 1 Spray(s) Both Nostrils two times a day  insulin lispro (ADMELOG) corrective regimen sliding scale   SubCutaneous Before meals and at bedtime    MEDICATIONS  (PRN):  acetaminophen   Tablet .. 650 milliGRAM(s) Oral every 6 hours PRN Mild Pain (1 - 3)  oxyCODONE    IR 5 milliGRAM(s) Oral every 4 hours PRN Moderate Pain (4 - 6)  oxyCODONE    IR 10 milliGRAM(s) Oral every 6 hours PRN Severe Pain (7 - 10)      ALLERGIES:  Allergies    No Known Allergies    Intolerances        LABS:                        10.7   8.24  )-----------( 370      ( 18 Feb 2021 14:35 )             33.7     02-18    144  |  109<H>  |  14  ----------------------------<  80  4.0   |  21<L>  |  0.76    Ca    9.3      18 Feb 2021 14:35    TPro  7.2  /  Alb  3.8  /  TBili  0.2  /  DBili  x   /  AST  12  /  ALT  10  /  AlkPhos  61  02-18    PT/INR - ( 18 Feb 2021 14:35 )   PT: 14.4 sec;   INR: 1.21          PTT - ( 18 Feb 2021 14:35 )  PTT:28.8 sec      RADIOLOGY & ADDITIONAL TESTS:   < from: CT Chest w/ IV Cont (02.18.21 @ 17:23) >  1.  Large mass/shelli aggregate encasing the jose, right mainstem bronchus and abutting mediastinal vessels as detailed above. Recommend metabolic and histologic assessment for further characterization.  2.  Small to moderate pericardial effusion.  3.  Mildly dilated aortic root 4.0 cm.  4.  Mildly dilated main pulmonary artery which can be seen in pulmonary artery hypertension.    < end of copied text >  < from: CT Angio Cardiac w/ IV Cont (02.18.21 @ 16:50) >  1.  Large heterogenous mass imaged encasing visualized portions of lower trachea, proximal bronchial airways and right interlobar bronchus. Mass is only partially imaged due to technique. Recommend contrast-enhanced CT of chest for complete assessment.  2.  Moderate pericardial effusion.    < end of copied text >

## 2021-02-19 NOTE — CONSULT NOTE ADULT - ATTENDING COMMENTS
Initial attending contact date  2/19/21    . See fellow note written above for details. I reviewed the fellow documentation. I have personally seen and examined this patient. I reviewed vitals, labs, medications, cardiac studies, and additional imaging. I agree with the above fellow's findings and plans as written above
I evaluated this 71-year-old male patient who is status post prostate cancer and underwent radical prostatectomy in 2016.  He has a 50-pack-year smoking history.  Actively smoking. He complained of chest pain.  He was ruled out for acute coronary syndrome.  As part of the work-up, a CT chest demonstrated a mediastinal mass.  I concur with the physical examination of the patient.  There is no evidence of superior vena caval syndrome.  There is no evidence of stridor.  The patient is has no respiratory embarrassment.  Adequate breath sounds are heard bilaterally.  I have reviewed the CT scan.  It shows a large mass that is encasing the jose.  The right mainstem bronchus is also narrowed.  It is abutting vessels in the mediastinum.  There is a small pericardial effusion.  Based on the CT findings, the mass is most likely mediastinal tumor. Less likely to be lung cancer, although lung cancers are statistically more common.  Will get EBUS done today

## 2021-02-19 NOTE — PROGRESS NOTE ADULT - PROBLEM SELECTOR PLAN 8
F: None  E: Replete PRN  N: DASH/CC    DVT: Lovenox  GI: None - Keep patient NPO pending procedures and biopsy  F: None  E: Replete PRN  N: DASH/CC    DVT: Lovenox  GI: None F: None  E: Replete PRN  N: DASH/CC  DVT: Lovenox  GI: None

## 2021-02-19 NOTE — PROGRESS NOTE ADULT - SUBJECTIVE AND OBJECTIVE BOX
### INCOMPLETE NOTE ###  ### INCOMPLETE NOTE ###    INTERVAL HPI/OVERNIGHT EVENTS:    SUBJECTIVE: Patient seen and examined at bedside.  REVIEW OF SYSTEMS:    CONSTITUTIONAL: No weakness, fevers or chills  EYES/ENT: No visual changes;  No vertigo or throat pain   NECK: No pain or stiffness  RESPIRATORY: No cough, wheezing, hemoptysis; No shortness of breath  CARDIOVASCULAR: No chest pain or palpitations  GASTROINTESTINAL: No abdominal or epigastric pain. No nausea, vomiting, or hematemesis; No diarrhea or constipation. No melena or hematochezia.  GENITOURINARY: No dysuria, frequency or hematuria  NEUROLOGICAL: No numbness or weakness  SKIN: No itching, rashes      OBJECTIVE:    VITAL SIGNS:  ICU Vital Signs Last 24 Hrs  T(C): 37.8 (19 Feb 2021 06:48), Max: 37.8 (19 Feb 2021 06:48)  T(F): 100.1 (19 Feb 2021 06:48), Max: 100.1 (19 Feb 2021 06:48)  HR: 78 (19 Feb 2021 06:48) (57 - 86)  BP: 126/61 (19 Feb 2021 06:48) (101/57 - 160/89)  BP(mean): --  ABP: --  ABP(mean): --  RR: 19 (19 Feb 2021 06:48) (16 - 19)  SpO2: 98% (19 Feb 2021 06:48) (96% - 99%)        CAPILLARY BLOOD GLUCOSE      POCT Blood Glucose.: 93 mg/dL (18 Feb 2021 22:07)      PHYSICAL EXAM:    General: NAD  HEENT: NC/AT; PERRL, clear conjunctiva  Neck: supple  Respiratory: CTA b/l  Cardiovascular: +S1/S2; RRR  Abdomen: soft, NT/ND; +BS x4  Extremities: 2+ peripheral pulses b/l; no LE edema  Skin: normal color and turgor; no rash  Neurological:   Psychiatry:    LABS:                        10.7   8.24  )-----------( 370      ( 18 Feb 2021 14:35 )             33.7     02-18    144  |  109<H>  |  14  ----------------------------<  80  4.0   |  21<L>  |  0.76    Ca    9.3      18 Feb 2021 14:35    TPro  7.2  /  Alb  3.8  /  TBili  0.2  /  DBili  x   /  AST  12  /  ALT  10  /  AlkPhos  61  02-18    PT/INR - ( 18 Feb 2021 14:35 )   PT: 14.4 sec;   INR: 1.21          PTT - ( 18 Feb 2021 14:35 )  PTT:28.8 sec      RADIOLOGY & ADDITIONAL TESTS: Reviewed.    MEDICATIONS:  MEDICATIONS  (STANDING):  atorvastatin 20 milliGRAM(s) Oral at bedtime  dextrose 50% Injectable 25 Gram(s) IV Push once  enoxaparin Injectable 40 milliGRAM(s) SubCutaneous at bedtime  fluticasone propionate 50 MICROgram(s)/spray Nasal Spray 1 Spray(s) Both Nostrils two times a day  insulin lispro (ADMELOG) corrective regimen sliding scale   SubCutaneous Before meals and at bedtime    MEDICATIONS  (PRN):  acetaminophen   Tablet .. 650 milliGRAM(s) Oral every 6 hours PRN Mild Pain (1 - 3)  oxyCODONE    IR 5 milliGRAM(s) Oral every 4 hours PRN Moderate Pain (4 - 6)  oxyCODONE    IR 10 milliGRAM(s) Oral every 6 hours PRN Severe Pain (7 - 10)      ALLERGIES:  Allergies    No Known Allergies    Intolerances         INTERVAL HPI/OVERNIGHT EVENTS:    SUBJECTIVE: Patient seen and examined at bedside. Patient continued to complain of substernal non-radiating chest pain. Patient endorsed that the pain was positional and exacerbated when he leaned forward to get up from his bed. The patient also endorsed significant dysphagia and odynophagia stating that it hurt to swallow both solids and liquids. This was evident as the patient attempted to drink water during interview and began coughing and complaining of difficulty immediately. The patient denies fever, chills, shortness of breath, hemoptysis. The patient endorses weight loss of 5-7 lbs though he was uncertain over what time period this occurred.    REVIEW OF SYSTEMS:    CONSTITUTIONAL: No weakness, fevers or chills; Patietn endorses weight loss of 5-7lbs  EYES/ENT: No visual changes;  No vertigo or throat pain   NECK: No pain or stiffness  RESPIRATORY: No cough, wheezing, hemoptysis; No shortness of breath  CARDIOVASCULAR: No chest pain to palpation; Chest pain is positional and exacerbated by leaning forward and alleviated by lying back; No palpitations noted  GASTROINTESTINAL: No abdominal or epigastric pain. No nausea, vomiting, or hematemesis; No diarrhea or constipation. No melena or hematochezia.  GENITOURINARY: No dysuria, frequency or hematuria  NEUROLOGICAL: No numbness or weakness  SKIN: No itching, rashes      OBJECTIVE:    VITAL SIGNS:  ICU Vital Signs Last 24 Hrs  T(C): 37.8 (19 Feb 2021 06:48), Max: 37.8 (19 Feb 2021 06:48)  T(F): 100.1 (19 Feb 2021 06:48), Max: 100.1 (19 Feb 2021 06:48)  HR: 78 (19 Feb 2021 06:48) (57 - 86)  BP: 126/61 (19 Feb 2021 06:48) (101/57 - 160/89)  BP(mean): --  ABP: --  ABP(mean): --  RR: 19 (19 Feb 2021 06:48) (16 - 19)  SpO2: 98% (19 Feb 2021 06:48) (96% - 99%)        CAPILLARY BLOOD GLUCOSE      POCT Blood Glucose.: 93 mg/dL (18 Feb 2021 22:07)      PHYSICAL EXAM:    General: NAD  HEENT: NC/AT; PERRL, clear conjunctiva  Neck: supple  Respiratory: CTA b/l  Cardiovascular: +S1/S2; RRR  Abdomen: soft, NT/ND; +BS x4  Extremities: 2+ peripheral pulses b/l; no LE edema  Skin: normal color and turgor; no rash  Neurological: no focal deficits noted    LABS:                        10.7   8.24  )-----------( 370      ( 18 Feb 2021 14:35 )             33.7     02-18    144  |  109<H>  |  14  ----------------------------<  80  4.0   |  21<L>  |  0.76    Ca    9.3      18 Feb 2021 14:35    TPro  7.2  /  Alb  3.8  /  TBili  0.2  /  DBili  x   /  AST  12  /  ALT  10  /  AlkPhos  61  02-18    PT/INR - ( 18 Feb 2021 14:35 )   PT: 14.4 sec;   INR: 1.21          PTT - ( 18 Feb 2021 14:35 )  PTT:28.8 sec      RADIOLOGY & ADDITIONAL TESTS: Reviewed.    MEDICATIONS:  MEDICATIONS  (STANDING):  atorvastatin 20 milliGRAM(s) Oral at bedtime  dextrose 50% Injectable 25 Gram(s) IV Push once  enoxaparin Injectable 40 milliGRAM(s) SubCutaneous at bedtime  fluticasone propionate 50 MICROgram(s)/spray Nasal Spray 1 Spray(s) Both Nostrils two times a day  insulin lispro (ADMELOG) corrective regimen sliding scale   SubCutaneous Before meals and at bedtime    MEDICATIONS  (PRN):  acetaminophen   Tablet .. 650 milliGRAM(s) Oral every 6 hours PRN Mild Pain (1 - 3)  oxyCODONE    IR 5 milliGRAM(s) Oral every 4 hours PRN Moderate Pain (4 - 6)  oxyCODONE    IR 10 milliGRAM(s) Oral every 6 hours PRN Severe Pain (7 - 10)      ALLERGIES:  Allergies    No Known Allergies    Intolerances         INTERVAL HPI/OVERNIGHT EVENTS:    SUBJECTIVE: Patient seen and examined at bedside. Patient continued to complain of substernal non-radiating chest pain. Patient endorsed that the pain was positional and exacerbated when he leaned forward to get up from his bed. The patient also endorsed significant dysphagia and odynophagia stating that it hurt to swallow both solids and liquids. This was evident as the patient attempted to drink water during interview and began coughing and complaining of difficulty immediately. The patient denies fever, chills, shortness of breath, hemoptysis. The patient endorses weight loss of 5-7 lbs though he was uncertain over what time period this occurred.    REVIEW OF SYSTEMS:    CONSTITUTIONAL: No weakness, fevers or chills; Patietn endorses weight loss of 5-7lbs  EYES/ENT: No visual changes;  No vertigo or throat pain   NECK: No pain or stiffness  RESPIRATORY: No cough, wheezing, hemoptysis; No shortness of breath  CARDIOVASCULAR: Chest pain, positional and exacerbated by leaning forward and alleviated by lying back.  GASTROINTESTINAL: Dysphagia over the past few weeks. Pain and difficulty swallowing solids and liquids.   GENITOURINARY: No dysuria, frequency or hematuria  NEUROLOGICAL: No numbness or weakness  SKIN: No itching, rashes      OBJECTIVE:    VITAL SIGNS:  ICU Vital Signs Last 24 Hrs  T(C): 37.8 (19 Feb 2021 06:48), Max: 37.8 (19 Feb 2021 06:48)  T(F): 100.1 (19 Feb 2021 06:48), Max: 100.1 (19 Feb 2021 06:48)  HR: 78 (19 Feb 2021 06:48) (57 - 86)  BP: 126/61 (19 Feb 2021 06:48) (101/57 - 160/89)  BP(mean): --  ABP: --  ABP(mean): --  RR: 19 (19 Feb 2021 06:48) (16 - 19)  SpO2: 98% (19 Feb 2021 06:48) (96% - 99%)        CAPILLARY BLOOD GLUCOSE      POCT Blood Glucose.: 93 mg/dL (18 Feb 2021 22:07)      PHYSICAL EXAM:    General: NAD, WNWP, Resting comfortably in bed.   HEENT: NC/AT; PERRL, clear conjunctiva  Neck: supple  Respiratory: CTA b/l  Cardiovascular: +S1/S2; RRR, no palpitations, pt became more uncomfortable leaning forward.  Abdomen: soft, NT/ND; +BS x4  Extremities: 2+ peripheral pulses b/l; no LE edema  Skin: normal color and turgor; no rash  Neurological: no focal deficits noted  Psych: appropriate mood/affect      LABS:                        10.7   8.24  )-----------( 370      ( 18 Feb 2021 14:35 )             33.7     02-18    144  |  109<H>  |  14  ----------------------------<  80  4.0   |  21<L>  |  0.76    Ca    9.3      18 Feb 2021 14:35    TPro  7.2  /  Alb  3.8  /  TBili  0.2  /  DBili  x   /  AST  12  /  ALT  10  /  AlkPhos  61  02-18    PT/INR - ( 18 Feb 2021 14:35 )   PT: 14.4 sec;   INR: 1.21          PTT - ( 18 Feb 2021 14:35 )  PTT:28.8 sec      RADIOLOGY & ADDITIONAL TESTS: Reviewed.    MEDICATIONS:  MEDICATIONS  (STANDING):  atorvastatin 20 milliGRAM(s) Oral at bedtime  dextrose 50% Injectable 25 Gram(s) IV Push once  enoxaparin Injectable 40 milliGRAM(s) SubCutaneous at bedtime  fluticasone propionate 50 MICROgram(s)/spray Nasal Spray 1 Spray(s) Both Nostrils two times a day  insulin lispro (ADMELOG) corrective regimen sliding scale   SubCutaneous Before meals and at bedtime    MEDICATIONS  (PRN):  acetaminophen   Tablet .. 650 milliGRAM(s) Oral every 6 hours PRN Mild Pain (1 - 3)  oxyCODONE    IR 5 milliGRAM(s) Oral every 4 hours PRN Moderate Pain (4 - 6)  oxyCODONE    IR 10 milliGRAM(s) Oral every 6 hours PRN Severe Pain (7 - 10)      ALLERGIES:  Allergies    No Known Allergies    Intolerances

## 2021-02-19 NOTE — PROGRESS NOTE ADULT - PROBLEM SELECTOR PLAN 6
Takes Metformin 500 mg BID  - SSI while inpatient  - F/U A1c in AM Takes Metformin 500 mg BID  - SSI while inpatient  - A1c 6.0

## 2021-02-19 NOTE — CONSULT NOTE ADULT - ASSESSMENT
71 year old male with a past medical history of DM, HTN, HLD, Prostate Cancer status post prostatectomy as of 2016 who presents with chest pain, dysphagia, and odynophagia. He was admitted for work up of mediastinal mass confirmed on CT chest. Echocardiogram showing circumferential pericardial effusion with possible loculation without signs of echocardiographic or clinical tamponade.     Pericardial effusion: Patient with effusion as above. No signs of clinical tamponade. Given large mediastinal mass, likely a malignant effusion.   -Would not perform diagnostic or therapeutic pericardial drainage at this time.   -Would repeat echocardiogram if patient develops symptoms.  -Hold antihypertensive meds and diuretics.   -If patient starts to decompensate, can call cardiology on call for bedside echo to assess for tamponade.   -Biopsy per pulm.     HTN: controlled.  HLD: Continue atorvastatin 20mg daily.     Recommendations are preliminary until attending attestation and signature.  71 year old male with a past medical history of DM, HTN, HLD, Prostate Cancer status post prostatectomy as of 2016 who presents with chest pain, dysphagia, and odynophagia. He was admitted for work up of mediastinal mass confirmed on CT chest. Echocardiogram showing circumferential pericardial effusion with possible loculation without signs of echocardiographic or clinical tamponade.     Pericardial effusion: Patient with effusion as above. No signs of clinical tamponade. Given large mediastinal mass, likely a malignant effusion.   -Would not perform diagnostic or therapeutic pericardial drainage at this time as patient remains asymptomatic, not tachycardic or hypotensive  -Would repeat echocardiogram if patient develops symptoms.  -Hold antihypertensive meds and diuretics.   -If patient starts to decompensate, can call cardiology on call for bedside echo to assess for tamponade.   -Biopsy per pulm.     HTN: controlled.  HLD: Continue atorvastatin 20mg daily.         Ayaka Infante MD  Cardiology consult attending

## 2021-02-19 NOTE — PROGRESS NOTE ADULT - PROBLEM SELECTOR PLAN 4
Hgb 10.7 on initial CBC, unknown baseline Hgb. Denies any dark or bloody bowel movements  - F/U Iron studies in AM  - Continue to monitor, maintain active T & S Hgb 10.7 on initial CBC, unknown baseline Hgb. Denies any dark or bloody bowel movements. On 2/19 Hgb 10.6 and Hct 34.  - 2/19 Iron studies show Iron 12 (low) %Sat 5% (low) Transferrin 197 (low) indicating a mixed picture of Iron Deficiency Anemia and Anemia of Chronic Disease likely due to malignancy  - Continue to monitor, maintain active T & S

## 2021-02-19 NOTE — PROGRESS NOTE ADULT - ASSESSMENT
Mr. Rosen is a 71 year old male with a past medical history of DM, HTN, HLD, Prostate Ca who presents with chest pain. He was admitted for work up of mediastinal mass.  Mr. Rosen is a 71 year old male with a past medical history of DM, HTN, HLD, Prostate Ca who presents with chest pain, dysphagia and odynophagia . He was admitted for work up of mediastinal mass confirmed on CT chest with contrast. Mr. Rosen is a 71 year old male with a past medical history of DM, HTN, HLD, Prostate Cancer status post prostatectomy as of 2016 who presents with chest pain, dysphagia, and odynophagia. He was admitted for work up of mediastinal mass confirmed on CT chest with contrast.

## 2021-02-19 NOTE — CONSULT NOTE ADULT - I HAVE PERSONALLY SEEN, EXAMINED, AND PARTICIPATED IN THE CARE OF THIS PATIENT.  I HAVE REVIEWED ALL PERTINENT CLINICAL INFORMATION, INCLUDING HISTORY, PHYSICAL EXAM, PLAN AND THE MEDICAL/PA/NP STUDENT’S NOTE AND AGREE EXCEPT AS NOTED.
Statement Selected
pt received A+Ox4, in no apparent distress. pt breathing even and unlabored. ALMODOVAR well x4. pt states he was bit on L hand by unknown animal, reportedly has mice at home. L hand swollen and red, warm to touch. +peripheral pulses. redness travels up to L forearm. pt c/o pain to area. pt offers no other complaints at this time. pt educated on POC, verbalized understanding. pt safety measures maintained.

## 2021-02-19 NOTE — PROGRESS NOTE ADULT - PROBLEM SELECTOR PLAN 1
2 week history of pain without relief. EKG NSR. Troponin negative. CXR showed widened mediastinum. CT chest showed a large mass encasing the jose, right mainstem bronchus and abutting mediastinal vessels and mild to moderate pericardial effusion  - Pulmonology made aware of mediastinal mass and plans to asses in AM  - Pending Bronchoscopy and biopsy on mass this admission. Need histologic differentiation to guide treatment course  - May require CT surgery evaluation for mass  - Monitor for signs of cardiac tamponade. Currently HD stable  - F/U Pulmonology recommendations 2 week history of chest pain without relief. EKG NSR. Troponin negative. CXR showed widened mediastinum. CT chest showed a large mass encasing the jose, right mainstem bronchus and abutting mediastinal vessels and mild to moderate pericardial effusion  - Pulmonology was consulted and bronchoscopy and biopsy on mass is scheduled for 2/19 at noon. Need histologic differentiation to guide treatment course  - Pulmonology recommended to follow up with a PET scan as well for this patient  - GI was consulted for dysphagia and odynophagia. Team is considering possible stent or feeding tube pending EGD or EUS  - F/U Mediastinal Mass Labs (Anti Ach Ab, AFP, beta-Hcg, LDH, TSH)   - May require CT surgery evaluation for mass  - Consult oncology to begin to bridge patient's care pending results of biopsy  - Monitor for signs of cardiac tamponade. Currently HD stable 2 week history of chest pain without relief. EKG NSR. Troponin negative. CXR showed widened mediastinum. CT chest showed a large mass encasing the jose, right mainstem bronchus and abutting mediastinal vessels and mild to moderate pericardial effusion  - Pulm performed bronchoscopy today.   - Pulmonology recommended to follow up with a PET scan as well for this patient  - GI was consulted for dysphagia and odynophagia and performed EGD following bronch, w/ findings: Mild external compression mid esophagus w/o luminal narrowing and no resistance or difficulty in passing endoscope  - F/U Mediastinal Mass Labs (Anti Ach Ab, AFP, beta-Hcg, LDH, TSH)   - Consult oncology to begin to bridge patient's care pending results of biopsy  - Monitor for signs of cardiac tamponade. Currently HD stable 2 week history of chest pain without relief. EKG NSR. Troponin negative. CXR showed widened mediastinum. CT chest showed a large mass encasing the jose, right mainstem bronchus and abutting mediastinal vessels and mild to moderate pericardial effusion  - Pulm performed bronchoscopy today.   - Pulmonology recommended to follow up with a PET scan as well for this patient  - GI was consulted for dysphagia and odynophagia and performed EGD following bronch, w/ findings: Mild external compression mid esophagus w/o luminal narrowing and no resistance or difficulty in passing endoscope  - F/U Mediastinal Mass Labs (AFP, beta-Hcg, LDH, TSH)   - Consult oncology to begin to bridge patient's care pending results of biopsy  - Monitor for signs of cardiac tamponade. Currently HD stable

## 2021-02-19 NOTE — CONSULT NOTE ADULT - ASSESSMENT
71 year old male with a past medical history of DM, HTN, HLD, Prostate Ca who presents with chest pain. Mr. Rosen states he has experienced chest pain for the past 2 weeks. He describes the pain as similar to heartburn, general discomfort. GI consulted for dysphagia.     #Dysphagia  - most likely in setting of extraluminal mass w/ abutment of mediastinal structures  - symptoms began few weeks ago, and occur w/ both solids and liquids  - will plan for EGD today to further evaluate; treatment most likely will consist of treating underlying malignancy  - keep NPO    Dustin Patterson MD  PGY-4, Gastroenterology Fellow  pager: [phone] 71 year old male with a past medical history of DM, HTN, HLD, Prostate Ca who presents with chest pain. Mr. Rosen states he has experienced chest pain for the past 2 weeks. He describes the pain as similar to heartburn, general discomfort. GI consulted for dysphagia.     #Dysphagia  - most likely in setting of extraluminal mass w/ abutment of mediastinal structures  - symptoms began few weeks ago, and occur w/ both solids and liquids  - will plan for EGD today to further evaluate; treatment most likely will consist of treating underlying malignancy  - keep NPO    ADDENDUM: s/p EGD w/ the following findings and recommendations.  Impressions:  1. Mild external compression mid esophagus w/o luminal narrowing and no resistance or difficulty in passing endoscope  Recommendations:  1. Continue diet as tolerated    Dustin Patterson MD  PGY-4, Gastroenterology Fellow  pager: [phone] 71 year old male with a past medical history of DM, HTN, HLD, Prostate Ca who presents with chest pain. Mr. Rosen states he has experienced chest pain for the past 2 weeks. He describes the pain as similar to heartburn, general discomfort. GI consulted for dysphagia.     #Dysphagia  - most likely in setting of extraluminal mass w/ abutment of mediastinal structures  - symptoms began few weeks ago, and occur w/ both solids and liquids  - will plan for EGD today to further evaluate; treatment most likely will consist of treating underlying malignancy  - keep NPO    ADDENDUM: s/p EGD w/ the following findings and recommendations.  Impressions:  1. Mild external compression mid esophagus w/o luminal narrowing and no resistance or difficulty in passing endoscope  Recommendations:  1. Continue diet as tolerated    Thank you for allowing us to participate in the care of this patient.  GI will sign off. Please call back with any questions or concerns.     Dustin Patterson MD  PGY-4, Gastroenterology Fellow  pager: [phone]

## 2021-02-19 NOTE — CONSULT NOTE ADULT - SUBJECTIVE AND OBJECTIVE BOX
GASTROENTEROLOGY CONSULT NOTE  HPI:  Mr. Rosen is a 71 year old male with a past medical history of DM, HTN, HLD, Prostate Ca who presents with chest pain. Mr. Rosen states he has experienced chest pain for the past 2 weeks. He describes the pain as similar to heartburn, general discomfort. It is substernal and nonradiating. He attempted to take over the counter antacids without relief. He states the pain is worsened by leaning forward and relieves with lying down. He endorses unintentional weight loss over the past month, but denies any night sweats. He denies any exertional component to the pain, cough or SOB. He went to his PCP today and instructed him to report to the ED after being given 324 mg of ASA. He denies any fevers/chills, changes in bowel or urinary habits.     In the ED, he was afebrile (98.4), HR 75, /90, RR 16, and saturating 98% on room air. Labs were significant Hgb 10.7, INR 1.21, and HCO3 21. Troponin were negative on arrival and EKG showed normal sinus rhythm. CXR showed a cardiomegaly and a widened mediastinum. CT angio cardiac showed a large heterogenous mass visualized at portions of lower trachea, proximal bronchial airways and right interlobar bronchus and a moderate pericardial effusion. CT chest with contrast showed a large mass encasing the jose, right mainstem bronchus and abutting mediastinal vessels. He was given maalox and famotidine for chest discomfort. Pulmonology was contacted regarding mediastinal mass. He was admitted for work up of mediastinal mass.  (18 Feb 2021 20:06)    GI consulted for dysphagia. Patient seen and examined at bedside.     Allergies    No Known Allergies    Intolerances      Home Medications:  Lipitor 20 mg oral tablet: 1 tab(s) orally once a day (18 Feb 2021 21:34)  metFORMIN 500 mg oral tablet: 1 tab(s) orally 2 times a day (18 Feb 2021 21:34)    MEDICATIONS:  MEDICATIONS  (STANDING):  atorvastatin 20 milliGRAM(s) Oral at bedtime  dextrose 50% Injectable 25 Gram(s) IV Push once  enoxaparin Injectable 40 milliGRAM(s) SubCutaneous at bedtime  fluticasone propionate 50 MICROgram(s)/spray Nasal Spray 1 Spray(s) Both Nostrils two times a day  insulin lispro (ADMELOG) corrective regimen sliding scale   SubCutaneous Before meals and at bedtime  magnesium sulfate  IVPB 2 Gram(s) IV Intermittent once  potassium chloride  10 mEq/100 mL IVPB 10 milliEquivalent(s) IV Intermittent every 1 hour    MEDICATIONS  (PRN):  acetaminophen   Tablet .. 650 milliGRAM(s) Oral every 6 hours PRN Mild Pain (1 - 3)  oxyCODONE    IR 5 milliGRAM(s) Oral every 4 hours PRN Moderate Pain (4 - 6)  oxyCODONE    IR 10 milliGRAM(s) Oral every 6 hours PRN Severe Pain (7 - 10)    PAST MEDICAL & SURGICAL HISTORY:  Prostate cancer    High cholesterol    HTN (hypertension)    Diabetes    H/O prostatectomy      FAMILY HISTORY:  Family history of hyperlipidemia    Family history of diabetes mellitus (DM)    FH: HTN (hypertension)      SOCIAL HISTORY:  Tobacco: [ ] Current, [ ] Former, [ ] Never; Pack Years:  Alcohol:  Illicit Drugs:    REVIEW OF SYSTEMS:  CONSTITUTIONAL: No weakness, fevers or chills  HEENT: No visual changes; No vertigo or throat pain   NECK: No pain or stiffness  RESPIRATORY: No cough, wheezing, hemoptysis; No shortness of breath  CARDIOVASCULAR: No chest pain or palpitations  GASTROINTESTINAL: As above.  GENITOURINARY: No dysuria, frequency or hematuria  NEUROLOGICAL: No numbness or weakness  SKIN: No itching, burning, rashes, or lesions   All other 10 review of systems is negative unless indicated above.    Vital Signs Last 24 Hrs  T(C): 36.9 (19 Feb 2021 11:36), Max: 37.8 (19 Feb 2021 06:48)  T(F): 98.4 (19 Feb 2021 11:36), Max: 100.1 (19 Feb 2021 06:48)  HR: 62 (19 Feb 2021 11:36) (57 - 86)  BP: 113/73 (19 Feb 2021 11:36) (101/57 - 160/89)  BP(mean): 86 (19 Feb 2021 11:36) (86 - 86)  RR: 19 (19 Feb 2021 11:36) (16 - 19)  SpO2: 98% (19 Feb 2021 11:36) (96% - 99%)      PHYSICAL EXAM:    General: Well developed; well nourished; in no acute distress  Eyes: Anicteric sclerae, moist conjunctivae  HENT: Moist mucous membranes  Neck: Trachea midline, supple  Lungs: Normal respiratory effort, no intercostal retractions  Cardiovascular: RRR  Abdomen: Soft, non-tender non-distended; Normal bowel sounds; No rebound or guarding  Extremities: Normal range of motion, No clubbing, cyanosis or edema  Neurological: Alert and oriented x3  Skin: Warm and dry. No obvious rash    LABS:                        10.6   7.25  )-----------( 328      ( 19 Feb 2021 08:30 )             34.0     02-19    136  |  102  |  12  ----------------------------<  98  3.8   |  23  |  0.78    Ca    8.7      19 Feb 2021 08:30  Mg     1.8     02-19    TPro  7.2  /  Alb  3.8  /  TBili  0.2  /  DBili  x   /  AST  12  /  ALT  10  /  AlkPhos  61  02-18        PT/INR - ( 18 Feb 2021 14:35 )   PT: 14.4 sec;   INR: 1.21          PTT - ( 18 Feb 2021 14:35 )  PTT:28.8 sec    RADIOLOGY & ADDITIONAL STUDIES:     Reviewed

## 2021-02-19 NOTE — CONSULT NOTE ADULT - ASSESSMENT
Mr. Rosen is a 71 year old male with a past medical history of DM, HTN, HLD, Prostate Ca who presents with chest pain. He was admitted for work up of mediastinal mass.     Problem/Plan 1: mediastinal mass  - patient with significant pack year history, w/o b-symptoms and CT scan with meddle mediastinal mass  - plan for bronchoscopy today, keep NPO  - recommend further scans; CT AP and MRI brain or PET scan for staging, can be done outpatient Mr. Rosen is a 71 year old male with a past medical history of DM, HTN, HLD, Prostate Ca who presents with chest pain. He was admitted for work up of mediastinal mass.     Problem/Plan 1: mediastinal mass  - patient with significant pack year history, w/o b-symptoms and CT scan with mediastinal mass  - plan for bronchoscopy today, keep NPO  - recommend further scans; PET scan for staging, which can be done after bronch

## 2021-02-20 LAB
ALBUMIN SERPL ELPH-MCNC: 3.5 G/DL — SIGNIFICANT CHANGE UP (ref 3.3–5)
ALP SERPL-CCNC: 52 U/L — SIGNIFICANT CHANGE UP (ref 40–120)
ALT FLD-CCNC: 7 U/L — LOW (ref 10–45)
ANION GAP SERPL CALC-SCNC: 12 MMOL/L — SIGNIFICANT CHANGE UP (ref 5–17)
AST SERPL-CCNC: 9 U/L — LOW (ref 10–40)
BASOPHILS # BLD AUTO: 0 K/UL — SIGNIFICANT CHANGE UP (ref 0–0.2)
BASOPHILS NFR BLD AUTO: 0 % — SIGNIFICANT CHANGE UP (ref 0–2)
BILIRUB SERPL-MCNC: 0.2 MG/DL — SIGNIFICANT CHANGE UP (ref 0.2–1.2)
BUN SERPL-MCNC: 18 MG/DL — SIGNIFICANT CHANGE UP (ref 7–23)
CALCIUM SERPL-MCNC: 9.4 MG/DL — SIGNIFICANT CHANGE UP (ref 8.4–10.5)
CHLORIDE SERPL-SCNC: 102 MMOL/L — SIGNIFICANT CHANGE UP (ref 96–108)
CO2 SERPL-SCNC: 21 MMOL/L — LOW (ref 22–31)
CREAT SERPL-MCNC: 0.82 MG/DL — SIGNIFICANT CHANGE UP (ref 0.5–1.3)
EOSINOPHIL # BLD AUTO: 0 K/UL — SIGNIFICANT CHANGE UP (ref 0–0.5)
EOSINOPHIL NFR BLD AUTO: 0 % — SIGNIFICANT CHANGE UP (ref 0–6)
GLUCOSE BLDC GLUCOMTR-MCNC: 146 MG/DL — HIGH (ref 70–99)
GLUCOSE BLDC GLUCOMTR-MCNC: 151 MG/DL — HIGH (ref 70–99)
GLUCOSE BLDC GLUCOMTR-MCNC: 174 MG/DL — HIGH (ref 70–99)
GLUCOSE SERPL-MCNC: 149 MG/DL — HIGH (ref 70–99)
HCG-TM SERPL-ACNC: 110 MIU/ML — HIGH
HCT VFR BLD CALC: 34.9 % — LOW (ref 39–50)
HGB BLD-MCNC: 10.8 G/DL — LOW (ref 13–17)
IMM GRANULOCYTES NFR BLD AUTO: 0.6 % — SIGNIFICANT CHANGE UP (ref 0–1.5)
LDH SERPL L TO P-CCNC: 326 U/L — HIGH (ref 50–242)
LYMPHOCYTES # BLD AUTO: 0.62 K/UL — LOW (ref 1–3.3)
LYMPHOCYTES # BLD AUTO: 5.2 % — LOW (ref 13–44)
MAGNESIUM SERPL-MCNC: 2.2 MG/DL — SIGNIFICANT CHANGE UP (ref 1.6–2.6)
MCHC RBC-ENTMCNC: 26.7 PG — LOW (ref 27–34)
MCHC RBC-ENTMCNC: 30.9 GM/DL — LOW (ref 32–36)
MCV RBC AUTO: 86.2 FL — SIGNIFICANT CHANGE UP (ref 80–100)
MONOCYTES # BLD AUTO: 0.95 K/UL — HIGH (ref 0–0.9)
MONOCYTES NFR BLD AUTO: 7.9 % — SIGNIFICANT CHANGE UP (ref 2–14)
NEUTROPHILS # BLD AUTO: 10.39 K/UL — HIGH (ref 1.8–7.4)
NEUTROPHILS NFR BLD AUTO: 86.3 % — HIGH (ref 43–77)
NRBC # BLD: 0 /100 WBCS — SIGNIFICANT CHANGE UP (ref 0–0)
PHOSPHATE SERPL-MCNC: 2.3 MG/DL — LOW (ref 2.5–4.5)
PLATELET # BLD AUTO: 364 K/UL — SIGNIFICANT CHANGE UP (ref 150–400)
POTASSIUM SERPL-MCNC: 4.1 MMOL/L — SIGNIFICANT CHANGE UP (ref 3.5–5.3)
POTASSIUM SERPL-SCNC: 4.1 MMOL/L — SIGNIFICANT CHANGE UP (ref 3.5–5.3)
PROT SERPL-MCNC: 7.1 G/DL — SIGNIFICANT CHANGE UP (ref 6–8.3)
RBC # BLD: 4.05 M/UL — LOW (ref 4.2–5.8)
RBC # FLD: 16.6 % — HIGH (ref 10.3–14.5)
SODIUM SERPL-SCNC: 135 MMOL/L — SIGNIFICANT CHANGE UP (ref 135–145)
T4 AB SER-ACNC: 5.43 UG/DL — SIGNIFICANT CHANGE UP (ref 3.17–11.72)
TSH SERPL-MCNC: 1.21 UIU/ML — SIGNIFICANT CHANGE UP (ref 0.35–4.94)
WBC # BLD: 12.03 K/UL — HIGH (ref 3.8–10.5)
WBC # FLD AUTO: 12.03 K/UL — HIGH (ref 3.8–10.5)

## 2021-02-20 PROCEDURE — 99233 SBSQ HOSP IP/OBS HIGH 50: CPT | Mod: GC

## 2021-02-20 PROCEDURE — 99232 SBSQ HOSP IP/OBS MODERATE 35: CPT | Mod: GC

## 2021-02-20 RX ADMIN — Medication 1 SPRAY(S): at 18:24

## 2021-02-20 RX ADMIN — Medication 1: at 22:28

## 2021-02-20 RX ADMIN — Medication 1 SPRAY(S): at 06:05

## 2021-02-20 RX ADMIN — Medication 62.5 MILLIMOLE(S): at 10:49

## 2021-02-20 RX ADMIN — ATORVASTATIN CALCIUM 20 MILLIGRAM(S): 80 TABLET, FILM COATED ORAL at 21:27

## 2021-02-20 RX ADMIN — ENOXAPARIN SODIUM 40 MILLIGRAM(S): 100 INJECTION SUBCUTANEOUS at 21:27

## 2021-02-20 NOTE — PROGRESS NOTE ADULT - PROBLEM SELECTOR PLAN 1
2 week history of chest pain without relief. EKG NSR. Troponin negative. CXR showed widened mediastinum. CT chest showed a large mass encasing the jose, right mainstem bronchus and abutting mediastinal vessels and mild to moderate pericardial effusion  - Pulm performed bronchoscopy today.   - Pulmonology recommended to follow up with a PET scan as well for this patient  - GI was consulted for dysphagia and odynophagia and performed EGD following bronch, w/ findings: Mild external compression mid esophagus w/o luminal narrowing and no resistance or difficulty in passing endoscope  - F/U Mediastinal Mass Labs (AFP, beta-Hcg, LDH, TSH)   - Consult oncology to begin to bridge patient's care pending results of biopsy  - Monitor for signs of cardiac tamponade. Currently HD stable 2 week history of chest pain without relief. EKG NSR. Troponin negative. CXR showed widened mediastinum. CT chest showed a large mass encasing the jose, right mainstem bronchus and abutting mediastinal vessels and mild to moderate pericardial effusion  - Pulm performed bronchoscopy 2/19  - Pulmonology recommended to follow up with a PET scan as well for this patient  - GI was consulted for dysphagia and odynophagia and performed EGD following bronch, w/ findings: Mild external compression mid esophagus w/o luminal narrowing and no resistance or difficulty in passing endoscope  - F/U Mediastinal Mass Labs (AFP, beta-Hcg, LDH, TSH)   - Consult oncology to begin to bridge patient's care pending results of biopsy  - Monitor for signs of cardiac tamponade. Currently HD stable

## 2021-02-20 NOTE — PROGRESS NOTE ADULT - SUBJECTIVE AND OBJECTIVE BOX
PULMONARY CONSULT FOLLOW-UP NOTE    INTERVAL HPI:  Reviewed chart and overnight events; patient seen and examined at bedside. Patient underwent EBUS with biopsy of right mediastinal mass yesterday and reports to be doing well. Denies any dyspnea, fever/chills, has been ambulating to the bathroom.     MEDICATIONS:  Pulmonary:    Antimicrobials:    Anticoagulants:  enoxaparin Injectable 40 milliGRAM(s) SubCutaneous at bedtime    Cardiac:      Allergies    No Known Allergies    Intolerances        Vital Signs Last 24 Hrs  T(C): 36.8 (20 Feb 2021 05:36), Max: 36.8 (20 Feb 2021 05:36)  T(F): 98.3 (20 Feb 2021 05:36), Max: 98.3 (20 Feb 2021 05:36)  HR: 52 (20 Feb 2021 05:36) (52 - 63)  BP: 115/56 (20 Feb 2021 05:36) (104/60 - 115/56)  BP(mean): --  RR: 19 (20 Feb 2021 05:36) (18 - 19)  SpO2: 96% (20 Feb 2021 05:36) (96% - 96%)        PHYSICAL EXAM:  Constitutional: WDWN  HEENT: NC/AT; PERRL, anicteric sclera  Neck: no JVD seen  Cardiovascular: +S1/S2, RRR  Respiratory: CTA B/L; no W/R/R  Gastrointestinal: soft, NT/ND; +BSx4  Extremities: WWP; no edema, clubbing or cyanosis  Vascular: 2+ radial, DP/PT pulses B/L  Neurological: AAOx3; no focal deficits    LABS:      CBC Full  -  ( 20 Feb 2021 07:54 )  WBC Count : 12.03 K/uL  RBC Count : 4.05 M/uL  Hemoglobin : 10.8 g/dL  Hematocrit : 34.9 %  Platelet Count - Automated : 364 K/uL  Mean Cell Volume : 86.2 fl  Mean Cell Hemoglobin : 26.7 pg  Mean Cell Hemoglobin Concentration : 30.9 gm/dL  Auto Neutrophil # : 10.39 K/uL  Auto Lymphocyte # : 0.62 K/uL  Auto Monocyte # : 0.95 K/uL  Auto Eosinophil # : 0.00 K/uL  Auto Basophil # : 0.00 K/uL  Auto Neutrophil % : 86.3 %  Auto Lymphocyte % : 5.2 %  Auto Monocyte % : 7.9 %  Auto Eosinophil % : 0.0 %  Auto Basophil % : 0.0 %    02-20    135  |  102  |  18  ----------------------------<  149<H>  4.1   |  21<L>  |  0.82    Ca    9.4      20 Feb 2021 07:54  Phos  2.3     02-20  Mg     2.2     02-20    TPro  7.1  /  Alb  3.5  /  TBili  0.2  /  DBili  x   /  AST  9<L>  /  ALT  7<L>  /  AlkPhos  52  02-20    PT/INR - ( 18 Feb 2021 14:35 )   PT: 14.4 sec;   INR: 1.21          PTT - ( 18 Feb 2021 14:35 )  PTT:28.8 sec                  RADIOLOGY & ADDITIONAL STUDIES:

## 2021-02-20 NOTE — PROGRESS NOTE ADULT - ASSESSMENT
Mr. Rosen is a 71 year old male with a past medical history of DM, HTN, HLD, Prostate Ca who presents with chest pain. He was admitted for work up of mediastinal mass.     Problem/Plan 1: Right mediastinal mass  - patient with significant pack year history, w/o b-symptoms and CT scan with right mediastinal mass s/p EBUS 2/19/2020  - patient doing well post procedure.   - PET scan for staging pending    2. Pericardial effusion:   - likely malignant, m/m as per cardio team.     The patient was s/e/d with the attending.

## 2021-02-20 NOTE — PROGRESS NOTE ADULT - ASSESSMENT
ASSESSMENT:  71M PMH DM, HTN, prostate cancer s/p prostatectomy (2016) presented initially with chest pain, dysphagia and odynophagia and admitted for work up of mediastinal mass. Found to have pericardial effusion with loculation, no echocardiographic or clinical signs of tamponade, cardiology consulted for effusion.    PLAN:  #Pericardial effusion  Remains hemodynamically stable. HR leola to normal rate, BP stable, no JVD. Heart sounds appreciated.   - No clinical signs of tamponade at this time,   - If becomes tachycardiac/hypotensive/decompensates, can call cardiology on call for urgent echocardiogram    #HTN  Stable off meds    #HLD  -c/w statin    Discussed with consult attending.

## 2021-02-20 NOTE — PROGRESS NOTE ADULT - SUBJECTIVE AND OBJECTIVE BOX
Cardiology Consult    O/N: RADHA  Interval History: presented with chest pain, dysphagia, odynophagia- in setting of mediastinal mass and pericardial effusion States unchanged symptoms, no headaches, no dizziness.       OBJECTIVE  Vitals:  T(C): 36.8 (02-20-21 @ 05:36), Max: 36.8 (02-20-21 @ 05:36)  HR: 52 (02-20-21 @ 05:36) (52 - 63)  BP: 115/56 (02-20-21 @ 05:36) (104/60 - 115/56)  RR: 19 (02-20-21 @ 05:36) (18 - 19)  SpO2: 96% (02-20-21 @ 05:36) (96% - 96%)  Wt(kg): --    I/O:  I&O's Summary      PHYSICAL EXAM:  Appearance: NAD. Speaking in full sentences.   HEENT: No JVD  Cardiovascular: RRR with no murmurs.  Respiratory: Lungs CTAB.   Gastrointestinal:  Soft, nontender. Non-distended. Non-rigid.	  Extremities: No edema b/l. No erythema b/l. LE WWP b/l. DP intact  Neurologic:  Alert and awake. Moving all extremities. Following commands.   	  LABS:                        10.8   12.03 )-----------( 364      ( 20 Feb 2021 07:54 )             34.9     02-20    135  |  102  |  18  ----------------------------<  149<H>  4.1   |  21<L>  |  0.82    Ca    9.4      20 Feb 2021 07:54  Phos  2.3     02-20  Mg     2.2     02-20    TPro  7.1  /  Alb  3.5  /  TBili  0.2  /  DBili  x   /  AST  9<L>  /  ALT  7<L>  /  AlkPhos  52  02-20    PT/INR - ( 18 Feb 2021 14:35 )   PT: 14.4 sec;   INR: 1.21          PTT - ( 18 Feb 2021 14:35 )  PTT:28.8 sec      RADIOLOGY & ADDITIONAL TESTS:  Reviewed .    MEDICATIONS  (STANDING):  atorvastatin 20 milliGRAM(s) Oral at bedtime  dextrose 50% Injectable 25 Gram(s) IV Push once  enoxaparin Injectable 40 milliGRAM(s) SubCutaneous at bedtime  fluticasone propionate 50 MICROgram(s)/spray Nasal Spray 1 Spray(s) Both Nostrils two times a day  insulin lispro (ADMELOG) corrective regimen sliding scale   SubCutaneous Before meals and at bedtime    MEDICATIONS  (PRN):  acetaminophen   Tablet .. 650 milliGRAM(s) Oral every 6 hours PRN Mild Pain (1 - 3)  oxyCODONE    IR 5 milliGRAM(s) Oral every 4 hours PRN Moderate Pain (4 - 6)  oxyCODONE    IR 10 milliGRAM(s) Oral every 6 hours PRN Severe Pain (7 - 10)

## 2021-02-20 NOTE — PROGRESS NOTE ADULT - PROBLEM SELECTOR PLAN 2
Seen on CT chest and CT cardiac angio as above  - Not exhibiting any signs of cardiac tamponade and hemodynamically stable  - 0/3 components of [place] (no JVD, hypotension, muffled heart sounds)  - TTE ordered for 2/19 to evaluate effusion and need for drain or pericardiocentesis (still pending)  - Obtain STAT cardiology consult if hemodynamically unstable

## 2021-02-20 NOTE — PROGRESS NOTE ADULT - PROBLEM SELECTOR PLAN 4
Hgb 10.7 on initial CBC, unknown baseline Hgb. Denies any dark or bloody bowel movements. On 2/19 Hgb 10.6 and Hct 34.  - 2/19 Iron studies show Iron 12 (low) %Sat 5% (low) Transferrin 197 (low) indicating a mixed picture of Iron Deficiency Anemia and Anemia of Chronic Disease likely due to malignancy  - Continue to monitor, maintain active T & S

## 2021-02-20 NOTE — PROGRESS NOTE ADULT - ASSESSMENT
Mr. Rosen is a 71 year old male with a past medical history of DM, HTN, HLD, Prostate Cancer status post prostatectomy as of 2016 who presents with chest pain, dysphagia, and odynophagia. He was admitted for work up of mediastinal mass confirmed on CT chest with contrast. Mr. Rosen is a 71 year old male with a past medical history of DM, HTN, HLD, Prostate Cancer status post prostatectomy as of 2016 who presents with chest pain, dysphagia, and odynophagia. He was admitted for work up of mediastinal mass confirmed on CT chest with contrast. S/p bronchoscopy/EGD, with cards/pulm/GI following.

## 2021-02-20 NOTE — PROGRESS NOTE ADULT - SUBJECTIVE AND OBJECTIVE BOX
INTERVAL HPI/OVERNIGHT EVENTS:    SUBJECTIVE: Patient seen and examined at bedside.    OBJECTIVE:    VITAL SIGNS:  ICU Vital Signs Last 24 Hrs  T(C): 36.8 (20 Feb 2021 05:36), Max: 37.8 (19 Feb 2021 06:48)  T(F): 98.3 (20 Feb 2021 05:36), Max: 100.1 (19 Feb 2021 06:48)  HR: 52 (20 Feb 2021 05:36) (52 - 78)  BP: 115/56 (20 Feb 2021 05:36) (104/60 - 126/61)  BP(mean): 86 (19 Feb 2021 11:36) (86 - 86)  ABP: --  ABP(mean): --  RR: 19 (20 Feb 2021 05:36) (18 - 19)  SpO2: 96% (20 Feb 2021 05:36) (96% - 98%)        CAPILLARY BLOOD GLUCOSE      POCT Blood Glucose.: 282 mg/dL (19 Feb 2021 22:13)      PHYSICAL EXAM:        MEDICATIONS:  MEDICATIONS  (STANDING):  atorvastatin 20 milliGRAM(s) Oral at bedtime  dextrose 50% Injectable 25 Gram(s) IV Push once  enoxaparin Injectable 40 milliGRAM(s) SubCutaneous at bedtime  fluticasone propionate 50 MICROgram(s)/spray Nasal Spray 1 Spray(s) Both Nostrils two times a day  insulin lispro (ADMELOG) corrective regimen sliding scale   SubCutaneous Before meals and at bedtime    MEDICATIONS  (PRN):  acetaminophen   Tablet .. 650 milliGRAM(s) Oral every 6 hours PRN Mild Pain (1 - 3)  oxyCODONE    IR 5 milliGRAM(s) Oral every 4 hours PRN Moderate Pain (4 - 6)  oxyCODONE    IR 10 milliGRAM(s) Oral every 6 hours PRN Severe Pain (7 - 10)      ALLERGIES:  Allergies    No Known Allergies    Intolerances        LABS:                        10.6   7.25  )-----------( 328      ( 19 Feb 2021 08:30 )             34.0     02-19    136  |  102  |  12  ----------------------------<  98  3.8   |  23  |  0.78    Ca    8.7      19 Feb 2021 08:30  Mg     1.8     02-19    TPro  7.2  /  Alb  3.8  /  TBili  0.2  /  DBili  x   /  AST  12  /  ALT  10  /  AlkPhos  61  02-18    PT/INR - ( 18 Feb 2021 14:35 )   PT: 14.4 sec;   INR: 1.21          PTT - ( 18 Feb 2021 14:35 )  PTT:28.8 sec      RADIOLOGY & ADDITIONAL TESTS: Reviewed. INTERVAL HPI/OVERNIGHT EVENTS:  NAEON.    SUBJECTIVE: Patient seen and examined at bedside. Pt. comfortable. Complaining of chronic chest pain in sternal area, nonradiating, which he has had for the past 2 weeks. Denies SOB, nausea, vomiting, fever, chills, abdominal pain, leg pain, diarrhea, constipation urinary changes.    OBJECTIVE:    VITAL SIGNS:  ICU Vital Signs Last 24 Hrs  T(C): 36.8 (20 Feb 2021 05:36), Max: 37.8 (19 Feb 2021 06:48)  T(F): 98.3 (20 Feb 2021 05:36), Max: 100.1 (19 Feb 2021 06:48)  HR: 52 (20 Feb 2021 05:36) (52 - 78)  BP: 115/56 (20 Feb 2021 05:36) (104/60 - 126/61)  BP(mean): 86 (19 Feb 2021 11:36) (86 - 86)  ABP: --  ABP(mean): --  RR: 19 (20 Feb 2021 05:36) (18 - 19)  SpO2: 96% (20 Feb 2021 05:36) (96% - 98%)        CAPILLARY BLOOD GLUCOSE      POCT Blood Glucose.: 282 mg/dL (19 Feb 2021 22:13)      PHYSICAL EXAM:  General: NAD, WNWP, Resting comfortably in bed.   HEENT: NC/AT; PERRL, clear conjunctiva  Neck: supple  Respiratory: CTA b/l  Cardiovascular: +S1/S2; RRR, no palpitations, pt became more uncomfortable leaning forward.  Abdomen: soft, NT/ND; +BS x4  Extremities: 2+ peripheral pulses b/l; no LE edema  Skin: normal color and turgor; no rash  Neurological: no focal deficits noted  Psych: appropriate mood/affect      MEDICATIONS:  MEDICATIONS  (STANDING):  atorvastatin 20 milliGRAM(s) Oral at bedtime  dextrose 50% Injectable 25 Gram(s) IV Push once  enoxaparin Injectable 40 milliGRAM(s) SubCutaneous at bedtime  fluticasone propionate 50 MICROgram(s)/spray Nasal Spray 1 Spray(s) Both Nostrils two times a day  insulin lispro (ADMELOG) corrective regimen sliding scale   SubCutaneous Before meals and at bedtime    MEDICATIONS  (PRN):  acetaminophen   Tablet .. 650 milliGRAM(s) Oral every 6 hours PRN Mild Pain (1 - 3)  oxyCODONE    IR 5 milliGRAM(s) Oral every 4 hours PRN Moderate Pain (4 - 6)  oxyCODONE    IR 10 milliGRAM(s) Oral every 6 hours PRN Severe Pain (7 - 10)      ALLERGIES:  Allergies    No Known Allergies    Intolerances        LABS:                        10.6   7.25  )-----------( 328      ( 19 Feb 2021 08:30 )             34.0     02-19    136  |  102  |  12  ----------------------------<  98  3.8   |  23  |  0.78    Ca    8.7      19 Feb 2021 08:30  Mg     1.8     02-19    TPro  7.2  /  Alb  3.8  /  TBili  0.2  /  DBili  x   /  AST  12  /  ALT  10  /  AlkPhos  61  02-18    PT/INR - ( 18 Feb 2021 14:35 )   PT: 14.4 sec;   INR: 1.21          PTT - ( 18 Feb 2021 14:35 )  PTT:28.8 sec      RADIOLOGY & ADDITIONAL TESTS: Reviewed.

## 2021-02-21 LAB
ALBUMIN SERPL ELPH-MCNC: 3.3 G/DL — SIGNIFICANT CHANGE UP (ref 3.3–5)
ALP SERPL-CCNC: 50 U/L — SIGNIFICANT CHANGE UP (ref 40–120)
ALT FLD-CCNC: 14 U/L — SIGNIFICANT CHANGE UP (ref 10–45)
ANION GAP SERPL CALC-SCNC: 8 MMOL/L — SIGNIFICANT CHANGE UP (ref 5–17)
AST SERPL-CCNC: 14 U/L — SIGNIFICANT CHANGE UP (ref 10–40)
BASOPHILS # BLD AUTO: 0.02 K/UL — SIGNIFICANT CHANGE UP (ref 0–0.2)
BASOPHILS NFR BLD AUTO: 0.3 % — SIGNIFICANT CHANGE UP (ref 0–2)
BILIRUB SERPL-MCNC: <0.2 MG/DL — SIGNIFICANT CHANGE UP (ref 0.2–1.2)
BUN SERPL-MCNC: 16 MG/DL — SIGNIFICANT CHANGE UP (ref 7–23)
CALCIUM SERPL-MCNC: 9 MG/DL — SIGNIFICANT CHANGE UP (ref 8.4–10.5)
CHLORIDE SERPL-SCNC: 108 MMOL/L — SIGNIFICANT CHANGE UP (ref 96–108)
CO2 SERPL-SCNC: 24 MMOL/L — SIGNIFICANT CHANGE UP (ref 22–31)
CREAT SERPL-MCNC: 0.81 MG/DL — SIGNIFICANT CHANGE UP (ref 0.5–1.3)
CULTURE RESULTS: SIGNIFICANT CHANGE UP
EOSINOPHIL # BLD AUTO: 0.09 K/UL — SIGNIFICANT CHANGE UP (ref 0–0.5)
EOSINOPHIL NFR BLD AUTO: 1.2 % — SIGNIFICANT CHANGE UP (ref 0–6)
GLUCOSE BLDC GLUCOMTR-MCNC: 103 MG/DL — HIGH (ref 70–99)
GLUCOSE BLDC GLUCOMTR-MCNC: 123 MG/DL — HIGH (ref 70–99)
GLUCOSE BLDC GLUCOMTR-MCNC: 92 MG/DL — SIGNIFICANT CHANGE UP (ref 70–99)
GLUCOSE BLDC GLUCOMTR-MCNC: 97 MG/DL — SIGNIFICANT CHANGE UP (ref 70–99)
GLUCOSE BLDC GLUCOMTR-MCNC: 97 MG/DL — SIGNIFICANT CHANGE UP (ref 70–99)
GLUCOSE SERPL-MCNC: 94 MG/DL — SIGNIFICANT CHANGE UP (ref 70–99)
HCT VFR BLD CALC: 34.3 % — LOW (ref 39–50)
HGB BLD-MCNC: 10.6 G/DL — LOW (ref 13–17)
IMM GRANULOCYTES NFR BLD AUTO: 0.4 % — SIGNIFICANT CHANGE UP (ref 0–1.5)
LYMPHOCYTES # BLD AUTO: 1.22 K/UL — SIGNIFICANT CHANGE UP (ref 1–3.3)
LYMPHOCYTES # BLD AUTO: 16.2 % — SIGNIFICANT CHANGE UP (ref 13–44)
MAGNESIUM SERPL-MCNC: 1.9 MG/DL — SIGNIFICANT CHANGE UP (ref 1.6–2.6)
MCHC RBC-ENTMCNC: 26.6 PG — LOW (ref 27–34)
MCHC RBC-ENTMCNC: 30.9 GM/DL — LOW (ref 32–36)
MCV RBC AUTO: 86.2 FL — SIGNIFICANT CHANGE UP (ref 80–100)
MONOCYTES # BLD AUTO: 0.78 K/UL — SIGNIFICANT CHANGE UP (ref 0–0.9)
MONOCYTES NFR BLD AUTO: 10.3 % — SIGNIFICANT CHANGE UP (ref 2–14)
NEUTROPHILS # BLD AUTO: 5.4 K/UL — SIGNIFICANT CHANGE UP (ref 1.8–7.4)
NEUTROPHILS NFR BLD AUTO: 71.6 % — SIGNIFICANT CHANGE UP (ref 43–77)
NRBC # BLD: 0 /100 WBCS — SIGNIFICANT CHANGE UP (ref 0–0)
PHOSPHATE SERPL-MCNC: 2.9 MG/DL — SIGNIFICANT CHANGE UP (ref 2.5–4.5)
PLATELET # BLD AUTO: 330 K/UL — SIGNIFICANT CHANGE UP (ref 150–400)
POTASSIUM SERPL-MCNC: 4 MMOL/L — SIGNIFICANT CHANGE UP (ref 3.5–5.3)
POTASSIUM SERPL-SCNC: 4 MMOL/L — SIGNIFICANT CHANGE UP (ref 3.5–5.3)
PROT SERPL-MCNC: 6.9 G/DL — SIGNIFICANT CHANGE UP (ref 6–8.3)
RBC # BLD: 3.98 M/UL — LOW (ref 4.2–5.8)
RBC # FLD: 16.8 % — HIGH (ref 10.3–14.5)
SODIUM SERPL-SCNC: 140 MMOL/L — SIGNIFICANT CHANGE UP (ref 135–145)
SPECIMEN SOURCE: SIGNIFICANT CHANGE UP
WBC # BLD: 7.54 K/UL — SIGNIFICANT CHANGE UP (ref 3.8–10.5)
WBC # FLD AUTO: 7.54 K/UL — SIGNIFICANT CHANGE UP (ref 3.8–10.5)

## 2021-02-21 PROCEDURE — 99233 SBSQ HOSP IP/OBS HIGH 50: CPT | Mod: GC

## 2021-02-21 PROCEDURE — 78815 PET IMAGE W/CT SKULL-THIGH: CPT | Mod: 26

## 2021-02-21 RX ADMIN — ENOXAPARIN SODIUM 40 MILLIGRAM(S): 100 INJECTION SUBCUTANEOUS at 21:32

## 2021-02-21 RX ADMIN — Medication 1 SPRAY(S): at 05:56

## 2021-02-21 RX ADMIN — ATORVASTATIN CALCIUM 20 MILLIGRAM(S): 80 TABLET, FILM COATED ORAL at 21:32

## 2021-02-21 RX ADMIN — Medication 1 SPRAY(S): at 17:15

## 2021-02-21 NOTE — PROGRESS NOTE ADULT - SUBJECTIVE AND OBJECTIVE BOX
Patient was seen and examined by me at bedside. I agree with resident's note, subjective, objective physical exam, assessment and plan with following modifications/additions.    Greater than 35 minutes spent on total encounter; more than 50% of the visit was spent counseling and/or coordinating care by the attending physician.    71 year old male with pmh of DM, HTN, HLD, prostate cancer s/p prostatectomy who presents with chest pain and dysphagia found to mediastinal mass confirmed on CT chest,  s/p bronchoscopy with EBUS and biopsy and EGD 2/19, incidentally noted pericardial effusion ?tamponade unlikely clinically per cards. s/p PET for staging 2/21, pending path prelim result also. Otherwise now medically stable  -Mediastinal mass, c/f lymphoma- Would involve heme onc tomorrow in house- have them review prelim path and PET (results pending Sun), discuss if need rad onc involvement needed now, and discuss setup of outpt f/u plan. F/u additional pulm recs. Will need opioids on dc for pain control   -Dysphagia, s/p EGD with no intervenable obstruction 2/2 compression from mass- appreciate GI and swallow recs- ok for diet   -Pericardial effusion likely malignant, no tamponade clinically per cards- likely needs repeat  TTE as per cards as outpt   -DM- ISS  -HL- Statin   -DVT px- lovenox  -Dispo- no needs, home pending discussion with pulm and onc tomorrow if safe and clear outpt f/u plan      Prashanth Bowman MD [phone]

## 2021-02-22 ENCOUNTER — TRANSCRIPTION ENCOUNTER (OUTPATIENT)
Age: 71
End: 2021-02-22

## 2021-02-22 VITALS
SYSTOLIC BLOOD PRESSURE: 106 MMHG | DIASTOLIC BLOOD PRESSURE: 71 MMHG | TEMPERATURE: 99 F | OXYGEN SATURATION: 98 % | RESPIRATION RATE: 17 BRPM | HEART RATE: 60 BPM

## 2021-02-22 LAB
AFP-TM SERPL-MCNC: <1.8 NG/ML — SIGNIFICANT CHANGE UP
ANION GAP SERPL CALC-SCNC: 10 MMOL/L — SIGNIFICANT CHANGE UP (ref 5–17)
BASOPHILS # BLD AUTO: 0.03 K/UL — SIGNIFICANT CHANGE UP (ref 0–0.2)
BASOPHILS NFR BLD AUTO: 0.5 % — SIGNIFICANT CHANGE UP (ref 0–2)
BUN SERPL-MCNC: 13 MG/DL — SIGNIFICANT CHANGE UP (ref 7–23)
CALCIUM SERPL-MCNC: 9 MG/DL — SIGNIFICANT CHANGE UP (ref 8.4–10.5)
CHLORIDE SERPL-SCNC: 105 MMOL/L — SIGNIFICANT CHANGE UP (ref 96–108)
CO2 SERPL-SCNC: 24 MMOL/L — SIGNIFICANT CHANGE UP (ref 22–31)
CREAT SERPL-MCNC: 0.83 MG/DL — SIGNIFICANT CHANGE UP (ref 0.5–1.3)
EOSINOPHIL # BLD AUTO: 0.37 K/UL — SIGNIFICANT CHANGE UP (ref 0–0.5)
EOSINOPHIL NFR BLD AUTO: 6.3 % — HIGH (ref 0–6)
GLUCOSE BLDC GLUCOMTR-MCNC: 107 MG/DL — HIGH (ref 70–99)
GLUCOSE BLDC GLUCOMTR-MCNC: 93 MG/DL — SIGNIFICANT CHANGE UP (ref 70–99)
GLUCOSE SERPL-MCNC: 100 MG/DL — HIGH (ref 70–99)
HCT VFR BLD CALC: 34.7 % — LOW (ref 39–50)
HGB BLD-MCNC: 10.8 G/DL — LOW (ref 13–17)
IMM GRANULOCYTES NFR BLD AUTO: 0.3 % — SIGNIFICANT CHANGE UP (ref 0–1.5)
LYMPHOCYTES # BLD AUTO: 0.93 K/UL — LOW (ref 1–3.3)
LYMPHOCYTES # BLD AUTO: 15.9 % — SIGNIFICANT CHANGE UP (ref 13–44)
MAGNESIUM SERPL-MCNC: 1.8 MG/DL — SIGNIFICANT CHANGE UP (ref 1.6–2.6)
MCHC RBC-ENTMCNC: 26.5 PG — LOW (ref 27–34)
MCHC RBC-ENTMCNC: 31.1 GM/DL — LOW (ref 32–36)
MCV RBC AUTO: 85.3 FL — SIGNIFICANT CHANGE UP (ref 80–100)
MONOCYTES # BLD AUTO: 0.75 K/UL — SIGNIFICANT CHANGE UP (ref 0–0.9)
MONOCYTES NFR BLD AUTO: 12.8 % — SIGNIFICANT CHANGE UP (ref 2–14)
NEUTROPHILS # BLD AUTO: 3.74 K/UL — SIGNIFICANT CHANGE UP (ref 1.8–7.4)
NEUTROPHILS NFR BLD AUTO: 64.2 % — SIGNIFICANT CHANGE UP (ref 43–77)
NRBC # BLD: 0 /100 WBCS — SIGNIFICANT CHANGE UP (ref 0–0)
PLATELET # BLD AUTO: 349 K/UL — SIGNIFICANT CHANGE UP (ref 150–400)
POTASSIUM SERPL-MCNC: 4.1 MMOL/L — SIGNIFICANT CHANGE UP (ref 3.5–5.3)
POTASSIUM SERPL-SCNC: 4.1 MMOL/L — SIGNIFICANT CHANGE UP (ref 3.5–5.3)
RBC # BLD: 4.07 M/UL — LOW (ref 4.2–5.8)
RBC # FLD: 16.6 % — HIGH (ref 10.3–14.5)
SODIUM SERPL-SCNC: 139 MMOL/L — SIGNIFICANT CHANGE UP (ref 135–145)
WBC # BLD: 5.84 K/UL — SIGNIFICANT CHANGE UP (ref 3.8–10.5)
WBC # FLD AUTO: 5.84 K/UL — SIGNIFICANT CHANGE UP (ref 3.8–10.5)

## 2021-02-22 PROCEDURE — 88344 IMHCHEM/IMCYTCHM EA MLT ANTB: CPT

## 2021-02-22 PROCEDURE — 82105 ALPHA-FETOPROTEIN SERUM: CPT

## 2021-02-22 PROCEDURE — 83615 LACTATE (LD) (LDH) ENZYME: CPT

## 2021-02-22 PROCEDURE — 93005 ELECTROCARDIOGRAM TRACING: CPT

## 2021-02-22 PROCEDURE — 80048 BASIC METABOLIC PNL TOTAL CA: CPT

## 2021-02-22 PROCEDURE — 86803 HEPATITIS C AB TEST: CPT

## 2021-02-22 PROCEDURE — 84443 ASSAY THYROID STIM HORMONE: CPT

## 2021-02-22 PROCEDURE — 85025 COMPLETE CBC W/AUTO DIFF WBC: CPT

## 2021-02-22 PROCEDURE — 83735 ASSAY OF MAGNESIUM: CPT

## 2021-02-22 PROCEDURE — 83550 IRON BINDING TEST: CPT

## 2021-02-22 PROCEDURE — 99285 EMERGENCY DEPT VISIT HI MDM: CPT

## 2021-02-22 PROCEDURE — 94640 AIRWAY INHALATION TREATMENT: CPT

## 2021-02-22 PROCEDURE — 83540 ASSAY OF IRON: CPT

## 2021-02-22 PROCEDURE — 80053 COMPREHEN METABOLIC PANEL: CPT

## 2021-02-22 PROCEDURE — 71045 X-RAY EXAM CHEST 1 VIEW: CPT

## 2021-02-22 PROCEDURE — 75574 CT ANGIO HRT W/3D IMAGE: CPT

## 2021-02-22 PROCEDURE — 84100 ASSAY OF PHOSPHORUS: CPT

## 2021-02-22 PROCEDURE — 93306 TTE W/DOPPLER COMPLETE: CPT

## 2021-02-22 PROCEDURE — 78815 PET IMAGE W/CT SKULL-THIGH: CPT

## 2021-02-22 PROCEDURE — 85730 THROMBOPLASTIN TIME PARTIAL: CPT

## 2021-02-22 PROCEDURE — 82962 GLUCOSE BLOOD TEST: CPT

## 2021-02-22 PROCEDURE — 84704 HCG FREE BETACHAIN TEST: CPT

## 2021-02-22 PROCEDURE — A9552: CPT

## 2021-02-22 PROCEDURE — 83036 HEMOGLOBIN GLYCOSYLATED A1C: CPT

## 2021-02-22 PROCEDURE — U0005: CPT

## 2021-02-22 PROCEDURE — 88305 TISSUE EXAM BY PATHOLOGIST: CPT

## 2021-02-22 PROCEDURE — 85610 PROTHROMBIN TIME: CPT

## 2021-02-22 PROCEDURE — 88173 CYTOPATH EVAL FNA REPORT: CPT

## 2021-02-22 PROCEDURE — 71260 CT THORAX DX C+: CPT

## 2021-02-22 PROCEDURE — 87070 CULTURE OTHR SPECIMN AEROBIC: CPT

## 2021-02-22 PROCEDURE — U0003: CPT

## 2021-02-22 PROCEDURE — 88341 IMHCHEM/IMCYTCHM EA ADD ANTB: CPT

## 2021-02-22 PROCEDURE — 84436 ASSAY OF TOTAL THYROXINE: CPT

## 2021-02-22 PROCEDURE — 84484 ASSAY OF TROPONIN QUANT: CPT

## 2021-02-22 PROCEDURE — 88360 TUMOR IMMUNOHISTOCHEM/MANUAL: CPT

## 2021-02-22 PROCEDURE — 82728 ASSAY OF FERRITIN: CPT

## 2021-02-22 PROCEDURE — 88342 IMHCHEM/IMCYTCHM 1ST ANTB: CPT

## 2021-02-22 PROCEDURE — 84466 ASSAY OF TRANSFERRIN: CPT

## 2021-02-22 PROCEDURE — 36415 COLL VENOUS BLD VENIPUNCTURE: CPT

## 2021-02-22 PROCEDURE — 99239 HOSP IP/OBS DSCHRG MGMT >30: CPT | Mod: GC

## 2021-02-22 RX ADMIN — Medication 1 SPRAY(S): at 16:27

## 2021-02-22 RX ADMIN — Medication 1 SPRAY(S): at 05:59

## 2021-02-22 NOTE — DISCHARGE NOTE PROVIDER - NSDCCPCAREPLAN_GEN_ALL_CORE_FT
PRINCIPAL DISCHARGE DIAGNOSIS  Diagnosis: Mediastinal mass  Assessment and Plan of Treatment: You came to the hospital with chest pain that has been going on for a couple of weeks. We took imaging of your chest, including xray, CAT scan, PET scan, and echocardiogram. You were found to have a mass in an area of your chest called the mediastinum.   You will need to follow up with pulmonology and hematology/oncology to determine how to proceed and what treatments are available for your condition.   In the meantime, please return to the hospital if you experience severe and worsening chest pain, shortness of breath, dizziness, or fainting. These could be signs of increasing pressure on your chest that can make it difficult for your heart to pump blood to your body.

## 2021-02-22 NOTE — CONSULT NOTE ADULT - ASSESSMENT
Oncology consulted after CT  showed a large heterogenous mass visualized at portions of lower trachea, proximal bronchial airways and right interlobar bronchus and a moderate pericardial effusion. CT chest with contrast showed a large mass encasing the jose, right mainstem bronchus and abutting mediastinal vessels.   Patient states that he was a long time smoker (.5-1 PPD from 19 to 66 years). he denies any recent constitutional symptoms including fever, night sweats, fatigue, lethargy or significant  unintentional weight loss (lost 4 lbs recently). he denies a family history of cancers. 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 He currently is s/p EBUS 2/19/2020 with biopsy of mass taken.  pathology pending. PET scan was performed and revealed the same mass (with SUV ~21) without evidence of metastatic disease    # Mediastinal mass  - s/p biopsy wit hpathology pending  - Tumor markers significant for elevated HCG, AFP was normal   - LDH 300s, no lymphadenopathy or constitutional symptoms make lymphoma less suspicious.   - Will follow up path for more recommendations  - Please schedule follow up with Dr. Chema Kaminski at [facility] (within one week of discharge, to discuss further care and management)    D/w Dr. Kaminski

## 2021-02-22 NOTE — DIETITIAN INITIAL EVALUATION ADULT. - PROBLEM SELECTOR PLAN 5
Hgb 10.7 on initial CBC, unknown baseline Hgb. Denies any dark or bloody bowel movements  - F/U Iron studies in AM  - Continue to monitor, maintain active T & S

## 2021-02-22 NOTE — DIETITIAN INITIAL EVALUATION ADULT. - PROBLEM SELECTOR PLAN 1
2 week history of pain without relief. EKG NSR. Troponin negative. CXR showed widened mediastinum. CT chest showed a large mass encasing the jose, right mainstem bronchus and abutting mediastinal vessels and mild to moderate pericardial effusion  - Pulmonology made aware of mediastinal mass and plans to asses in AM  - Pending Bronchoscopy and biopsy on mass this admission. Need histologic differentiation to guide treatment course  - May require CT surgery evaluation for mass  - Monitor for signs of cardiac tamponade. Currently HD stable  - F/U Pulmonology recommendations

## 2021-02-22 NOTE — DIETITIAN INITIAL EVALUATION ADULT. - OTHER INFO
72y/o male with history of 2TDM,HTN,HLD,Prostate CA, who presents with chest pain and odynophagia. Now denied any swallowing issues. No N/V/D/C.BM2/21.Some sternal area pain .Skin intact .Patient stated his weight 1 month ago and UBW was 202lbs.About 5 lbs weight loss over 1 month. Patient remains 128% of IBW. Eating 80% of trays Stated he never eats breakfast just coffee. Generally eats small lunch and bigger dinner of chicken/rice/salad/vegetables.Noted s/p bronch and EBUS pending PET to discuss next step.RD gave brief review of CCHO/DASH diet restrictions.

## 2021-02-22 NOTE — PROGRESS NOTE ADULT - ASSESSMENT
Mr. Rosen is a 71 year old male with a past medical history of DM, HTN, HLD, Prostate Cancer s/p prostatectomy 2016 who presents with chest pain, dysphagia, and odynophagia. He was admitted for work up of mediastinal mass confirmed on CT chest with contrast.

## 2021-02-22 NOTE — DISCHARGE NOTE PROVIDER - CARE PROVIDERS DIRECT ADDRESSES
,[email or web address]

## 2021-02-22 NOTE — PROGRESS NOTE ADULT - ATTENDING COMMENTS
-Pt. seen and examined  -Currently HD stable, NSR in 60s  -TTE w/ Normal Left and Right ventricular function, no significant valvular disease; Small Pericardial effusion w/ no echocardiographic signs of tamponade  -No clinical evidence of tamponade on exam; VSS  -Will sign off; Please re-consult if needed    Daron Ravi MD  Cardiology Attending
follow up with results of EBUS-TBNA of right mediastinal mass.   Patient will need outpatient pulmonary follow up on discharge.
Patient stable for discharge home w self care -- to follow-up with Heme-Onc as outpatient -- Final pathology is pending
Pt seen and examined by me at bedside this AM. Agree with above with addition,   VS, exam as above  labs/imaging reviewed     a/p:  1. Mediastinal mass   2. Pericardial effusion on TTE, no clinical presentation of cardiac tamponade  3. Dysphagia likely 2/2 extraluminal mass    -appreciate GI and pulm consult.  -scheduled for bronch and EGD this morning.   -PET scan afterward  rest of a/p as above.

## 2021-02-22 NOTE — CONSULT NOTE ADULT - SUBJECTIVE AND OBJECTIVE BOX
Hematology Oncology Consult Note    HPI:  Mr. Rosen is a 71 year old male with a past medical history of DM, HTN, HLD, Prostate Ca who presents with chest pain. Mr. Rosen states he has experienced chest pain for the past 2 weeks. He describes the pain as similar to heartburn, general discomfort. It is substernal and nonradiating. He attempted to take over the counter antacids without relief. He states the pain is worsened by leaning forward and relieves with lying down. He endorses unintentional weight loss over the past month, but denies any night sweats. He denies any exertional component to the pain, cough or SOB. He went to his PCP today and instructed him to report to the ED after being given 324 mg of ASA. He denies any fevers/chills, changes in bowel or urinary habits.   In the ED, he was afebrile (98.4), HR 75, /90, RR 16, and saturating 98% on room air. Labs were significant Hgb 10.7, INR 1.21, and HCO3 21. Troponin were negative on arrival and EKG showed normal sinus rhythm. CXR showed a cardiomegaly and a widened mediastinum. CT angio cardiac showed a large heterogenous mass visualized at portions of lower trachea, proximal bronchial airways and right interlobar bronchus and a moderate pericardial effusion. CT chest with contrast showed a large mass encasing the jose, right mainstem bronchus and abutting mediastinal vessels. He was given maalox and famotidine for chest discomfort. Pulmonology was contacted regarding mediastinal mass. He was admitted for work up of mediastinal mass.  (18 Feb 2021 20:06)    Oncology consulted after CT  showed a large heterogenous mass visualized at portions of lower trachea, proximal bronchial airways and right interlobar bronchus and a moderate pericardial effusion. CT chest with contrast showed a large mass encasing the jose, right mainstem bronchus and abutting mediastinal vessels.   Patient states that he was a long time smoker (.5-1 PPD from 19 to 66 years). he denies any recent constitutional symptoms including fever, night sweats, fatigue, lethargy or significant  unintentional weight loss (lost 4 lbs recently). he denies a family history of cancers. 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 He currently is s/p EBUS 2/19/2020 with biopsy of mass taken.  pathology pending. PET scan was performed and revealed the same mass (with SUV ~21) without evidence of metastatic disease    Allergies    No Known Allergies    Intolerances        MEDICATIONS  (STANDING):  atorvastatin 20 milliGRAM(s) Oral at bedtime  dextrose 50% Injectable 25 Gram(s) IV Push once  enoxaparin Injectable 40 milliGRAM(s) SubCutaneous at bedtime  fluticasone propionate 50 MICROgram(s)/spray Nasal Spray 1 Spray(s) Both Nostrils two times a day  insulin lispro (ADMELOG) corrective regimen sliding scale   SubCutaneous Before meals and at bedtime    MEDICATIONS  (PRN):  acetaminophen   Tablet .. 650 milliGRAM(s) Oral every 6 hours PRN Mild Pain (1 - 3)  oxyCODONE    IR 5 milliGRAM(s) Oral every 4 hours PRN Moderate Pain (4 - 6)  oxyCODONE    IR 10 milliGRAM(s) Oral every 6 hours PRN Severe Pain (7 - 10)      PAST MEDICAL & SURGICAL HISTORY:  Prostate cancer    High cholesterol    HTN (hypertension)    Diabetes    H/O prostatectomy        FAMILY HISTORY:  Family history of hyperlipidemia    Family history of diabetes mellitus (DM)    FH: HTN (hypertension)        SOCIAL HISTORY: No EtOH, no tobacco    REVIEW OF SYSTEMS:    CONSTITUTIONAL: No weakness, fevers or chills  EYES/ENT: No visual changes;  No vertigo or throat pain   NECK: No pain or stiffness  RESPIRATORY: No cough, wheezing, hemoptysis; No shortness of breath  CARDIOVASCULAR: No chest pain or palpitations  GASTROINTESTINAL: No abdominal or epigastric pain. No nausea, vomiting, or hematemesis; No diarrhea or constipation. No melena or hematochezia.  GENITOURINARY: No dysuria, frequency or hematuria  NEUROLOGICAL: No numbness or weakness  SKIN: No itching, burning, rashes, or lesions   All other review of systems is negative unless indicated above.        T(F): 99 (02-22-21 @ 14:03), Max: 99.2 (02-21-21 @ 20:28)  HR: 60 (02-22-21 @ 14:03)  BP: 106/71 (02-22-21 @ 14:03)  RR: 17 (02-22-21 @ 14:03)  SpO2: 98% (02-22-21 @ 14:03)  Wt(kg): --    GENERAL: NAD, well-developed  HEAD:  Atraumatic, Normocephalic  EYES: EOMI  NECK: Supple, No JVD, No lymphadenopathy  CHEST/LUNG: Non labored, clear.   HEART: Regular rate and rhythm; No murmurs, rubs, or gallops  ABDOMEN: Soft, Nontender, Nondistended; Bowel sounds present  EXTREMITIES:  2+ Peripheral Pulses, No clubbing, cyanosis, or edema  NEUROLOGY: non-focal  SKIN: No rashes or lesions                          10.8   5.84  )-----------( 349      ( 22 Feb 2021 08:06 )             34.7       02-22    139  |  105  |  13  ----------------------------<  100<H>  4.1   |  24  |  0.83    Ca    9.0      22 Feb 2021 08:06  Phos  2.9     02-21  Mg     1.8     02-22    TPro  6.9  /  Alb  3.3  /  TBili  <0.2  /  DBili  x   /  AST  14  /  ALT  14  /  AlkPhos  50  02-21      Magnesium, Serum: 1.8 mg/dL (02-22 @ 08:06)

## 2021-02-22 NOTE — PROGRESS NOTE ADULT - PROBLEM SELECTOR PLAN 8
F: None  E: Replete PRN  N: DASH/CC  DVT: Lovenox  GI: None F: None  E: Replete PRN  N: DASH/CC  DVT: Lovenox  GI: None  Dispo: no needs currently, home pending discussion with pulm and heme/onc if safe and clear outpt f/u plan

## 2021-02-22 NOTE — DISCHARGE NOTE NURSING/CASE MANAGEMENT/SOCIAL WORK - PATIENT PORTAL LINK FT
You can access the FollowMyHealth Patient Portal offered by [facility] by registering at the following website: [email or web address]. By joining [facility]’s FollowMyHealth portal, you will also be able to view your health information using other applications (apps) compatible with our system.

## 2021-02-22 NOTE — PROGRESS NOTE ADULT - PROBLEM SELECTOR PLAN 7
Not currently taking any antihypertensives   - Obtain collateral from PCP

## 2021-02-22 NOTE — DISCHARGE NOTE PROVIDER - NSDCMRMEDTOKEN_GEN_ALL_CORE_FT
Lipitor 20 mg oral tablet: 1 tab(s) orally once a day  metFORMIN 500 mg oral tablet: 1 tab(s) orally 2 times a day

## 2021-02-22 NOTE — PROGRESS NOTE ADULT - PROBLEM SELECTOR PLAN 2
Seen on CT chest and CT cardiac angio as above  - Not exhibiting any signs of cardiac tamponade and hemodynamically stable  - 0/3 components of [place] (no JVD, hypotension, muffled heart sounds)  - TTE ordered for 2/19 to evaluate effusion and need for drain or pericardiocentesis (still pending)  - Obtain STAT cardiology consult if hemodynamically unstable Seen on CT chest and CT cardiac angio as above  - Not exhibiting any signs of cardiac tamponade and hemodynamically stable  - 0/3 components of [place] (no JVD, hypotension, muffled heart sounds)  - TTE 2/19 showed pericardial effusion w/moderate-to-large collection adjacent to R atrium; no need for drain or pericardiocentesis   - Obtain STAT cardiology consult if hemodynamically unstable

## 2021-02-22 NOTE — DISCHARGE NOTE PROVIDER - HOSPITAL COURSE
#Discharge: do not delete    Patient is 72 yo M with past medical history of  DM, HTN, HLD, Prostate Cancer s/p prostatectomy 2016 and active smoker with >50 pack year smoking history.     Presented with _____, found to have _____    Problem List/Main Diagnoses (system-based):   #   #    Inpatient treatment course:     New medications:   Labs to be followed outpatient:   Exam to be followed outpatient:    #Discharge: do not delete    Patient is 70 yo M with past medical history of  DM, HTN, HLD, Prostate Cancer s/p prostatectomy 2016 and active smoker with >50 pack year smoking history.     Presented with chest pain for the past 2 weeks, associated with cough and dysphagia/pain with swallowing, found to have a large mediastinal mass.     Problem List/Main Diagnoses (system-based):   # Mediastinal mass  Imaging revealed large mass. PET scan suggests no metastasis. Needs to follow up with pulmonology and hematology-oncology.     #Pericardial effusion  Hemodynamically stable, no s/sx tamponade. Cardiology deemed no need to drain at this time.     Inpatient treatment course:   Patient presented with chest pain and was admitted on 2/18/2021. CXR revealed widened mediastinum and CT revealed large mediastinal mass. Trops negative and EKG NSR without evidence of acute ischemia. Chest pain was deemed likely to large mass and not acute ischemic event. Likewise, dysphagia was determined to be due to compression from mass. EGD confirmed external compression. Patient underwent bronchoscopy with biopsy of mass (2/19), EGD (2/19), Colonoscopy (2/19) and PET scan (2/21).    TTE Echo  CONCLUSIONS:   1. No significant valvular disease.   2. Normal left and right ventricular size and systolic function.   3. There is a small circumferential pericardial effusion with a moderate to large collection adajcent to the right atrium. There is systolic invagination of the right atrium. Diastolic invagination of the right ventricle not definitive. No significant respiratory changes in mitral inflow velocities. The inferior vena cava is normal in size (<2.1cm) with normal inspiratory collapse (>50%) consistent with normal right atrial pressure (  3, range 0-5mmHg). Cardiac tamponade is a clinical diagnosis. Clinical correlation required.   4. No prior echo is available for comparison.    PET scan   2/21/2021  Impression:  1.  Redemonstrated lobular soft tissue mediastinal mass which extends into the azygoesophageal recess measuring 0.6 x 6.4 x 14.3 cm (AP by TV by CC dimensions) with SUV 21.3. This abnormality is likely to be a malignancy.  2.  No lymphadenopathy or evidence of metastasis.  3.  Since 2/18/2021, new right-sided mild pleural effusion and left-sided trace pleural effusion.    CT Chest with IV Contrast:  2/18/2021  Impression:  1.  Large mass/shelli aggregate encasing the jose, right mainstem bronchus and abutting mediastinal vessels as detailed above. Recommend metabolic and histologic assessment for further characterization.  2.  Small to moderate pericardial effusion.  3.  Mildly dilated aortic root 4.0 cm.  4.  Mildly dilated main pulmonary artery which can be seen in pulmonary artery hypertension.    CT Angio Heart w/ IV contrast  2/18/2021  IMPRESSION:  1.  Large heterogenous mass imaged encasing visualized portions of lower trachea, proximal bronchial airways and right interlobar bronchus. Mass is only partially imaged due to technique. Recommend contrast-enhanced CT of chest for complete assessment.  2.  Moderate pericardial effusion.    Chest Xray  2/18/2021  FINDINGS/  impression: Cardiomegaly, thoracic aortic calcification.: Mediastinal widening/mass. Right upper lobe opacity. Levoscoliosis. Bilateral AC joint degenerative changes.    New medications:   Labs to be followed outpatient:   Exam to be followed outpatient:    #Discharge: do not delete    Patient is 72 yo M with past medical history of  DM, HTN, HLD, Prostate Cancer s/p prostatectomy 2016 and active smoker with >50 pack year smoking history.     Presented with chest pain for the past 2 weeks, associated with cough and dysphagia/pain with swallowing, found to have a large mediastinal mass.     Problem List/Main Diagnoses (system-based):   # Mediastinal mass  Imaging revealed large mass. PET scan suggests no metastasis. Needs to follow up with pulmonology and hematology-oncology.     #Pericardial effusion  Hemodynamically stable, no s/sx tamponade. Cardiology deemed no need to drain at this time.     #Dysphagia  Pt reports discomfort with swallowing. Underwent EGD, showing external compression. Etiology is likely due to mass effect compressing esophagus, in setting of extraluminal mass w/ abutment of mediastinal structures. No esophagitis seen on EGD.   EGD w/ the following findings and recommendations.  Impressions:  1. Mild external compression mid esophagus w/o luminal narrowing and no resistance or difficulty in passing endoscope    #Normocytic anemia  Hgb 10.7 on initial CBC, unknown baseline Hgb. Denies any dark or bloody bowel movements. On 2/19 Hgb 10.6 and Hct 34.  - 2/19 Iron studies show Iron 12 (low) %Sat 5% (low) Transferrin 197 (low) indicating a mixed picture of Iron Deficiency Anemia and Anemia of Chronic Disease likely due to malignancy  - EGD and Colonoscopy were negative for active bleeding.     #DM2  Patient with history of DM2, on metformin at home. 6.0 A1C on admission.     #H/O prostate CA  2016 - chemotherapy & prostatectomy     #Tobacco use  - Pt w/ long history of tobacco use, cutting back prior to admission. Did not require nicotine replacement while admitted, and is considering quitting. Was counseled on benefits of smoking cessation and options available. Pt reported preference to quit without assistance of nicotine replacement.       Inpatient treatment course:   Patient presented with chest pain and was admitted on 2/18/2021. CXR revealed widened mediastinum and CT revealed large mediastinal mass. Trops negative and EKG NSR without evidence of acute ischemia. Chest pain was deemed likely to large mass and not acute ischemic event. Likewise, dysphagia was determined to be due to compression from mass. EGD confirmed external compression. Patient underwent bronchoscopy with biopsy of mass (2/19), EGD (2/19), Colonoscopy (2/19) and PET scan (2/21).    TTE Echo  CONCLUSIONS:   1. No significant valvular disease.   2. Normal left and right ventricular size and systolic function.   3. There is a small circumferential pericardial effusion with a moderate to large collection adajcent to the right atrium. There is systolic invagination of the right atrium. Diastolic invagination of the right ventricle not definitive. No significant respiratory changes in mitral inflow velocities. The inferior vena cava is normal in size (<2.1cm) with normal inspiratory collapse (>50%) consistent with normal right atrial pressure (  3, range 0-5mmHg). Cardiac tamponade is a clinical diagnosis. Clinical correlation required.   4. No prior echo is available for comparison.    PET scan   2/21/2021  Impression:  1.  Redemonstrated lobular soft tissue mediastinal mass which extends into the azygoesophageal recess measuring 0.6 x 6.4 x 14.3 cm (AP by TV by CC dimensions) with SUV 21.3. This abnormality is likely to be a malignancy.  2.  No lymphadenopathy or evidence of metastasis.  3.  Since 2/18/2021, new right-sided mild pleural effusion and left-sided trace pleural effusion.    CT Chest with IV Contrast:  2/18/2021  Impression:  1.  Large mass/shelli aggregate encasing the jose, right mainstem bronchus and abutting mediastinal vessels as detailed above. Recommend metabolic and histologic assessment for further characterization.  2.  Small to moderate pericardial effusion.  3.  Mildly dilated aortic root 4.0 cm.  4.  Mildly dilated main pulmonary artery which can be seen in pulmonary artery hypertension.    CT Angio Heart w/ IV contrast  2/18/2021  IMPRESSION:  1.  Large heterogenous mass imaged encasing visualized portions of lower trachea, proximal bronchial airways and right interlobar bronchus. Mass is only partially imaged due to technique. Recommend contrast-enhanced CT of chest for complete assessment.  2.  Moderate pericardial effusion.    Chest Xray  2/18/2021  FINDINGS/  impression: Cardiomegaly, thoracic aortic calcification.: Mediastinal widening/mass. Right upper lobe opacity. Levoscoliosis. Bilateral AC joint degenerative changes.    New medications:   Labs to be followed outpatient:   Exam to be followed outpatient:

## 2021-02-22 NOTE — DISCHARGE NOTE PROVIDER - PROVIDER TOKENS
PROVIDER:[TOKEN:[[id number]],FOLLOWUP:[2 weeks]] PROVIDER:[TOKEN:[[id number]],FOLLOWUP:[2 weeks]],PROVIDER:[TOKEN:[[id number]],FOLLOWUP:[2 weeks]]

## 2021-02-22 NOTE — DIETITIAN INITIAL EVALUATION ADULT. - ORAL INTAKE PTA/DIET HISTORY
Per patient, he eats almost everything.Avoids desserts,but based on diet recall does not prescribe to a CCHO or DASH diet.

## 2021-02-22 NOTE — PROGRESS NOTE ADULT - SUBJECTIVE AND OBJECTIVE BOX
INTERVAL HPI/OVERNIGHT EVENTS:    SUBJECTIVE: Patient seen and examined at bedside. Patient continues to c/o substernal non-radiating CP, less intense now vs at admission. Pt also endorsed some dysphagia/odynophagia but is able to consume liquids. He denies f/c, SOB, hemoptysis, n/v/d/c. Today pt noted Hx of intermittent L leg numbness w/exercise but noted Hx of "normal blood pressure" in his leg.     REVIEW OF SYSTEMS:    CONSTITUTIONAL: No weakness, fevers or chills  EYES/ENT: No visual changes;  No vertigo or throat pain at rest  NECK: No pain or stiffness  RESPIRATORY: No cough, wheezing, hemoptysis; No shortness of breath  CARDIOVASCULAR: mild positional chest pain  GASTROINTESTINAL: Dysphagia w/difficulty swallowing liquids, solids  GENITOURINARY: No dysuria, frequency or hematuria  NEUROLOGICAL: No numbness or weakness  SKIN: No itching, rashes    OBJECTIVE:    VS:  T(C): 37 (02-22-21 @ 05:46), Max: 37.3 (02-21-21 @ 20:28)  HR: 57 (02-22-21 @ 05:46) (57 - 62)  BP: 116/71 (02-22-21 @ 05:46) (115/72 - 116/71)  RR: 17 (02-22-21 @ 05:46) (17 - 17)  SpO2: 94% (02-22-21 @ 05:46) (94% - 97%)    PHYSICAL EXAM:  Constitutional: WDWN resting comfortably in bed; NAD  Head: NC/AT  EENT: PERRL, EOMI, anicteric sclera; no nasal discharge; uvula midline, no oropharyngeal erythema or exudates; MMM  Neck: supple; no JVD or thyromegaly  Respiratory: CTA B/L; no W/R/R; no CVAT B/L; no retractions  Cardiovascular: +S1/S2; RRR; no M/R/G; PMI non-displaced  Gastrointestinal: soft, NT/ND abdomen; no rebound or guarding; +BSx4  Extremities: WWP, no clubbing or cyanosis; no peripheral edema  Musculoskeletal: NROM x4; no joint swelling, tenderness or erythema  Vascular: 2+ peripheral pulses B/L  Dermatologic: skin warm, dry and intact; no rashes, wounds, or scars  Neurologic: AAOx3; CNII-XII grossly intact; no focal deficits  Psychiatric: affect and characteristics of appearance, verbalizations, behaviors are appropriate    LABS:                        10.8   5.84  )-----------( 349      ( 22 Feb 2021 08:06 )             34.7     CAPILLARY BLOOD GLUCOSE    POCT Blood Glucose.: 107 mg/dL (22 Feb 2021 08:22)  POCT Blood Glucose.: 103 mg/dL (21 Feb 2021 21:54)      02-21    140  |  108  |  16  ----------------------------<  94  4.0   |  24  |  0.81    Ca    9.0      21 Feb 2021 07:09  Phos  2.9     02-21  Mg     1.9     02-21    TPro  6.9  /  Alb  3.3  /  TBili  <0.2  /  DBili  x   /  AST  14  /  ALT  14  /  AlkPhos  50  02-21    MEDICATIONS  (STANDING):  atorvastatin 20 milliGRAM(s) Oral at bedtime  dextrose 50% Injectable 25 Gram(s) IV Push once  enoxaparin Injectable 40 milliGRAM(s) SubCutaneous at bedtime  fluticasone propionate 50 MICROgram(s)/spray Nasal Spray 1 Spray(s) Both Nostrils two times a day  insulin lispro (ADMELOG) corrective regimen sliding scale   SubCutaneous Before meals and at bedtime    MEDICATIONS  (PRN):  acetaminophen   Tablet .. 650 milliGRAM(s) Oral every 6 hours PRN Mild Pain (1 - 3)  oxyCODONE    IR 5 milliGRAM(s) Oral every 4 hours PRN Moderate Pain (4 - 6)  oxyCODONE    IR 10 milliGRAM(s) Oral every 6 hours PRN Severe Pain (7 - 10)      RADIOLOGY & ADDITIONAL TESTS:           INTERVAL HPI/OVERNIGHT EVENTS:    SUBJECTIVE: Patient seen and examined at bedside. Patient noted some nonproductive coughing overnight; continues to c/o substernal non-radiating CP, less intense now vs at admission. Pt also endorsed some dysphagia/odynophagia but is able to consume liquids. He denies f/c, SOB, hemoptysis, n/v/d/c. Today pt described Hx of intermittent L leg numbness w/exercise but noted Hx of "normal blood pressure" in his leg.     REVIEW OF SYSTEMS:    CONSTITUTIONAL: No weakness, fevers or chills  EYES/ENT: No visual changes;  No vertigo or throat pain at rest; +coughing at night  NECK: No pain or stiffness  RESPIRATORY: No cough, wheezing, hemoptysis; No shortness of breath  CARDIOVASCULAR: mild positional chest pain  GASTROINTESTINAL: Dysphagia w/difficulty swallowing liquids, solids  GENITOURINARY: No dysuria, frequency or hematuria  NEUROLOGICAL: No numbness or weakness  SKIN: No itching, rashes    OBJECTIVE:    VS:  T(C): 37 (02-22-21 @ 05:46), Max: 37.3 (02-21-21 @ 20:28)  HR: 57 (02-22-21 @ 05:46) (57 - 62)  BP: 116/71 (02-22-21 @ 05:46) (115/72 - 116/71)  RR: 17 (02-22-21 @ 05:46) (17 - 17)  SpO2: 94% (02-22-21 @ 05:46) (94% - 97%)    PHYSICAL EXAM:  Constitutional: WDWN resting comfortably in bed; NAD  Head: NC/AT  EENT: PERRL, EOMI, anicteric sclera; no nasal discharge; uvula midline, no oropharyngeal erythema or exudates; MMM  Neck: supple; no JVD or thyromegaly  Respiratory: CTA B/L; no W/R/R; no CVAT B/L; no retractions  Cardiovascular: +S1/S2; RRR; no M/R/G; PMI non-displaced  Gastrointestinal: soft, NT/ND abdomen; no rebound or guarding; +BSx4  Extremities: WWP, no clubbing or cyanosis; no peripheral edema  Musculoskeletal: NROM x4; no joint swelling, tenderness or erythema  Vascular: 2+ peripheral pulses B/L  Dermatologic: skin warm, dry and intact; no rashes, wounds, or scars  Neurologic: AAOx3; CNII-XII grossly intact; no focal deficits  Psychiatric: affect and characteristics of appearance, verbalizations, behaviors are appropriate    LABS:                        10.8   5.84  )-----------( 349      ( 22 Feb 2021 08:06 )             34.7     CAPILLARY BLOOD GLUCOSE    POCT Blood Glucose.: 107 mg/dL (22 Feb 2021 08:22)  POCT Blood Glucose.: 103 mg/dL (21 Feb 2021 21:54)      02-22    139  |  105  |  13  ----------------------------<  100<H>  4.1   |  24  |  0.83    Ca    9.0      22 Feb 2021 08:06  Phos  2.9     02-21  Mg     1.8     02-22    TPro  6.9  /  Alb  3.3  /  TBili  <0.2  /  DBili  x   /  AST  14  /  ALT  14  /  AlkPhos  50  02-21      MEDICATIONS  (STANDING):  atorvastatin 20 milliGRAM(s) Oral at bedtime  dextrose 50% Injectable 25 Gram(s) IV Push once  enoxaparin Injectable 40 milliGRAM(s) SubCutaneous at bedtime  fluticasone propionate 50 MICROgram(s)/spray Nasal Spray 1 Spray(s) Both Nostrils two times a day  insulin lispro (ADMELOG) corrective regimen sliding scale   SubCutaneous Before meals and at bedtime    MEDICATIONS  (PRN):  acetaminophen   Tablet .. 650 milliGRAM(s) Oral every 6 hours PRN Mild Pain (1 - 3)  oxyCODONE    IR 5 milliGRAM(s) Oral every 4 hours PRN Moderate Pain (4 - 6)  oxyCODONE    IR 10 milliGRAM(s) Oral every 6 hours PRN Severe Pain (7 - 10)      RADIOLOGY & ADDITIONAL TESTS:           INTERVAL HPI/OVERNIGHT EVENTS:    SUBJECTIVE: Patient seen and examined at bedside. Patient endorsed some nonproductive coughing overnight. He continues to c/o substernal non-radiating CP, less intense now vs at admission; also endorsed continued dysphagia/odynophagia but is able to consume liquids & solids. He denies f/c, SOB, hemoptysis, n/v/d/c. Today pt described Hx of intermittent L leg numbness w/exercise but noted Hx of "normal blood pressure" in his leg.     REVIEW OF SYSTEMS:    CONSTITUTIONAL: No weakness, fevers or chills  EYES/ENT: No visual changes;  No vertigo or throat pain at rest; +coughing at night  NECK: No pain or stiffness  RESPIRATORY: No cough, wheezing, hemoptysis; No shortness of breath  CARDIOVASCULAR: mild positional chest pain  GASTROINTESTINAL: Dysphagia w/difficulty swallowing liquids, solids  GENITOURINARY: No dysuria, frequency or hematuria  NEUROLOGICAL: No numbness or weakness  SKIN: No itching, rashes    OBJECTIVE:    VS:  T(C): 37 (02-22-21 @ 05:46), Max: 37.3 (02-21-21 @ 20:28)  HR: 57 (02-22-21 @ 05:46) (57 - 62)  BP: 116/71 (02-22-21 @ 05:46) (115/72 - 116/71)  RR: 17 (02-22-21 @ 05:46) (17 - 17)  SpO2: 94% (02-22-21 @ 05:46) (94% - 97%)    PHYSICAL EXAM:  Constitutional: WDWN resting comfortably in bed; NAD  Head: NC/AT  EENT: PERRL, EOMI, anicteric sclera; no nasal discharge; uvula midline, no oropharyngeal erythema or exudates; MMM  Neck: supple; no JVD or thyromegaly  Respiratory: CTA B/L; no W/R/R; no CVAT B/L; no retractions  Cardiovascular: +S1/S2; RRR; no M/R/G; PMI non-displaced  Gastrointestinal: soft, NT/ND abdomen; no rebound or guarding; +BSx4  Extremities: WWP, no clubbing or cyanosis; no peripheral edema  Musculoskeletal: NROM x4; no joint swelling, tenderness or erythema  Vascular: 2+ peripheral pulses B/L  Dermatologic: skin warm, dry and intact; no rashes, wounds, or scars  Neurologic: AAOx3; CNII-XII grossly intact; no focal deficits  Psychiatric: affect and characteristics of appearance, verbalizations, behaviors are appropriate    LABS:                        10.8   5.84  )-----------( 349      ( 22 Feb 2021 08:06 )             34.7     CAPILLARY BLOOD GLUCOSE    POCT Blood Glucose.: 107 mg/dL (22 Feb 2021 08:22)  POCT Blood Glucose.: 103 mg/dL (21 Feb 2021 21:54)      02-22    139  |  105  |  13  ----------------------------<  100<H>  4.1   |  24  |  0.83    Ca    9.0      22 Feb 2021 08:06  Phos  2.9     02-21  Mg     1.8     02-22    TPro  6.9  /  Alb  3.3  /  TBili  <0.2  /  DBili  x   /  AST  14  /  ALT  14  /  AlkPhos  50  02-21      MEDICATIONS  (STANDING):  atorvastatin 20 milliGRAM(s) Oral at bedtime  dextrose 50% Injectable 25 Gram(s) IV Push once  enoxaparin Injectable 40 milliGRAM(s) SubCutaneous at bedtime  fluticasone propionate 50 MICROgram(s)/spray Nasal Spray 1 Spray(s) Both Nostrils two times a day  insulin lispro (ADMELOG) corrective regimen sliding scale   SubCutaneous Before meals and at bedtime    MEDICATIONS  (PRN):  acetaminophen   Tablet .. 650 milliGRAM(s) Oral every 6 hours PRN Mild Pain (1 - 3)  oxyCODONE    IR 5 milliGRAM(s) Oral every 4 hours PRN Moderate Pain (4 - 6)  oxyCODONE    IR 10 milliGRAM(s) Oral every 6 hours PRN Severe Pain (7 - 10)      RADIOLOGY & ADDITIONAL TESTS:

## 2021-02-22 NOTE — PROGRESS NOTE ADULT - PROBLEM SELECTOR PLAN 4
Hgb 10.7 on initial CBC, unknown baseline Hgb. Denies any dark or bloody bowel movements. On 2/19 Hgb 10.6 and Hct 34.  - 2/19 Iron studies show Iron 12 (low) %Sat 5% (low) Transferrin 197 (low) indicating a mixed picture of Iron Deficiency Anemia and Anemia of Chronic Disease likely due to malignancy  - Continue to monitor, maintain active T & S Hgb 10.7 on initial CBC, unknown baseline Hgb. Denies any dark or bloody bowel movements. On 2/19 Hgb 10.6 and Hct 34 -> 2/22 Hgb 10.8, Hct 34.  - 2/19 Iron studies show Iron 12 (low) %Sat 5% (low) Transferrin 197 (low) indicating a mixed picture of Iron Deficiency Anemia and Anemia of Chronic Disease likely due to malignancy  - Continue to monitor, maintain active T & S

## 2021-02-22 NOTE — DISCHARGE NOTE NURSING/CASE MANAGEMENT/SOCIAL WORK - NSDCFUADDAPPT_GEN_ALL_CORE_FT
ONCOLOGY:  PLease call the office of Dr. Kaminski to schedule an appointment after you leave the hospital. You should be seen within two weeks.   [phone]    PULMONOLOGY:  Please call the office of Dr. Garza to schedule an appointment after you leave the hospital. You should be seen within two weeks.   [phone]

## 2021-02-22 NOTE — DISCHARGE NOTE PROVIDER - NSDCFUADDAPPT_GEN_ALL_CORE_FT
ONCOLOGY:  PLease call the office of Dr. Kaminski to schedule an appointment after you leave the hospital. You should be seen within two weeks.   [phone]    PULMONOLOGY:  Please call the office of Dr. Garza to schedule an appointment after you leave the hospital. You should be seen within two weeks.   [phone]

## 2021-02-22 NOTE — DISCHARGE NOTE PROVIDER - CARE PROVIDER_API CALL
Lee Garza)  [facility] [address]  Phone: [phone]  Fax: [phone]  Follow Up Time: 2 weeks   Lee Garza)  [facility]  [address], [address] [place] [address]  Phone: [phone]  Fax: [phone]  Follow Up Time: 2 weeks    Chema Kaminski (DO)  [facility] Psychiatry; HematologyOncology; Internal Medicine  [address] [place] [address]  Phone: [phone]  Fax: [phone]  Follow Up Time: 2 weeks

## 2021-02-22 NOTE — PROGRESS NOTE ADULT - PROBLEM SELECTOR PLAN 1
2 week history of chest pain without relief. EKG NSR. Troponin negative. CXR showed widened mediastinum. CT chest showed a large mass encasing the jose, right mainstem bronchus and abutting mediastinal vessels and mild to moderate pericardial effusion  - Pulm performed bronch 2/19; results pending  - PET scan performed 2/20; final results pending  - GI consult for dysphagia s/p EGD with no intervenable obstruction 2/2 compression from mass- appreciate GI and swallow recs- ok for diet   - F/U Mediastinal Mass Labs (AFP, beta-Hcg, LDH, TSH)   - Consult heme/onc to begin to bridge patient's care pending results of biopsy & PET scan; discuss if need rad onc involvement needed now, and discuss setup of outpt f/u plan. F/u additional pulm recs. Will need opioids on dc for pain control 2 week history of chest pain without relief. EKG NSR. Troponin negative. CXR showed widened mediastinum. CT chest showed a large mass encasing the jose, right mainstem bronchus and abutting mediastinal vessels and mild to moderate pericardial effusion  - Pulm performed bronch 2/19; results pending  - PET scan 2/21 shows lobular soft tissue mediastinal mass, 0.6 x 6.4 x 14.3 cm (AP by TV by CC dimensions) with SUV 21.3, likely malignancy; no lymphadenopathy or evidence of metastasis. Since 2/18/2021, new right-sided mild pleural effusion and left-sided trace pleural effusion.  - GI consult for dysphagia s/p EGD with no intervenable obstruction 2/2 compression from mass- appreciate GI and swallow recs- ok for diet   - F/U Mediastinal Mass Labs (AFP, beta-Hcg, LDH, TSH)   - Consult heme/onc to begin to bridge patient's care; discuss if need rad onc involvement needed now, and discuss setup of outpt f/u plan. F/u additional pulm recs. Will need opioids on dc for pain control 2 week history of chest pain without relief. EKG NSR. Troponin negative. CXR showed widened mediastinum. CT chest showed a large mass encasing the jose, right mainstem bronchus and abutting mediastinal vessels and mild to moderate pericardial effusion. PET scan (2/21) showed lobular soft tissue mediastinal mass, 0.6 x 6.4 x 14.3 cm (AP by TV by CC dimensions) with SUV 21.3, likely malignancy; no lymphadenopathy or evidence of metastasis. Since 2/18/2021, new right-sided mild pleural effusion and left-sided trace pleural effusion.  - Pulm performed bronch 2/19; cytopath results pending  - GI consult for dysphagia s/p EGD with no intervenable obstruction 2/2 compression from mass- appreciate GI and swallow recs- ok for diet   - F/U Mediastinal Mass Labs (AFP, beta-Hcg, LDH, TSH): TSH 1.213, T4 5.43, (H), bHCG 110(H); AFP pending  - Consult heme/onc to begin to bridge patient's care; discuss if need rad onc involvement needed now, and discuss setup of outpt f/u plan. F/u additional pulm recs. Will need opioids on dc for pain control

## 2021-02-23 PROBLEM — C61 MALIGNANT NEOPLASM OF PROSTATE: Chronic | Status: ACTIVE | Noted: 2021-02-18

## 2021-02-23 PROBLEM — E11.9 TYPE 2 DIABETES MELLITUS WITHOUT COMPLICATIONS: Chronic | Status: ACTIVE | Noted: 2021-02-18

## 2021-02-23 PROBLEM — E78.00 PURE HYPERCHOLESTEROLEMIA, UNSPECIFIED: Chronic | Status: ACTIVE | Noted: 2021-02-18

## 2021-02-23 PROBLEM — I10 ESSENTIAL (PRIMARY) HYPERTENSION: Chronic | Status: ACTIVE | Noted: 2021-02-18

## 2021-02-23 LAB — NON-GYNECOLOGICAL CYTOLOGY STUDY: SIGNIFICANT CHANGE UP

## 2021-02-26 DIAGNOSIS — E78.5 HYPERLIPIDEMIA, UNSPECIFIED: ICD-10-CM

## 2021-02-26 DIAGNOSIS — I10 ESSENTIAL (PRIMARY) HYPERTENSION: ICD-10-CM

## 2021-02-26 DIAGNOSIS — D63.0 ANEMIA IN NEOPLASTIC DISEASE: ICD-10-CM

## 2021-02-26 DIAGNOSIS — E11.9 TYPE 2 DIABETES MELLITUS WITHOUT COMPLICATIONS: ICD-10-CM

## 2021-02-26 DIAGNOSIS — F17.200 NICOTINE DEPENDENCE, UNSPECIFIED, UNCOMPLICATED: ICD-10-CM

## 2021-02-26 DIAGNOSIS — C85.92 NON-HODGKIN LYMPHOMA, UNSPECIFIED, INTRATHORACIC LYMPH NODES: ICD-10-CM

## 2021-02-26 DIAGNOSIS — R53.1 WEAKNESS: ICD-10-CM

## 2021-02-26 DIAGNOSIS — R13.10 DYSPHAGIA, UNSPECIFIED: ICD-10-CM

## 2021-02-26 DIAGNOSIS — I31.3 PERICARDIAL EFFUSION (NONINFLAMMATORY): ICD-10-CM

## 2021-02-26 DIAGNOSIS — Z72.0 TOBACCO USE: ICD-10-CM

## 2021-03-10 ENCOUNTER — APPOINTMENT (OUTPATIENT)
Dept: INFECTIOUS DISEASE | Facility: CLINIC | Age: 71
End: 2021-03-10

## 2021-03-18 ENCOUNTER — APPOINTMENT (OUTPATIENT)
Dept: PULMONOLOGY | Facility: CLINIC | Age: 71
End: 2021-03-18
Payer: MEDICARE

## 2021-03-18 VITALS
DIASTOLIC BLOOD PRESSURE: 76 MMHG | TEMPERATURE: 97.9 F | HEART RATE: 88 BPM | SYSTOLIC BLOOD PRESSURE: 130 MMHG | WEIGHT: 193 LBS | HEIGHT: 68 IN | BODY MASS INDEX: 29.25 KG/M2 | RESPIRATION RATE: 12 BRPM | OXYGEN SATURATION: 98 %

## 2021-03-18 DIAGNOSIS — J98.59 OTHER DISEASES OF MEDIASTINUM, NOT ELSEWHERE CLASSIFIED: ICD-10-CM

## 2021-03-18 DIAGNOSIS — Z87.891 PERSONAL HISTORY OF NICOTINE DEPENDENCE: ICD-10-CM

## 2021-03-18 PROCEDURE — 99072 ADDL SUPL MATRL&STAF TM PHE: CPT

## 2021-03-18 PROCEDURE — 99215 OFFICE O/P EST HI 40 MIN: CPT

## 2021-03-18 NOTE — PHYSICAL EXAM
[No Acute Distress] : no acute distress [Normal Oropharynx] : normal oropharynx [Normal Appearance] : normal appearance [No Neck Mass] : no neck mass [Normal Rate/Rhythm] : normal rate/rhythm [Normal S1, S2] : normal s1, s2 [No Murmurs] : no murmurs [No Resp Distress] : no resp distress [No Abnormalities] : no abnormalities [Benign] : benign [Normal Gait] : normal gait [No Clubbing] : no clubbing [No Cyanosis] : no cyanosis [FROM] : FROM [Normal Color/ Pigmentation] : normal color/ pigmentation [No Focal Deficits] : no focal deficits [Oriented x3] : oriented x3 [Normal Affect] : normal affect [1+ Pitting] : 1+ pitting [TextBox_11] : no pallor, no icterus [TextBox_68] : good air entry bilaterally, no wheezing, rhonchi or crackles [TextBox_99] : significant manifestation of OA  [TextBox_105] : significant manifestation of OA

## 2021-03-18 NOTE — REASON FOR VISIT
[Follow-Up - From Hospitalization] : a follow-up visit after a recent hospitalization [TextBox_44] : mediastinal mass

## 2021-03-18 NOTE — HISTORY OF PRESENT ILLNESS
[TextBox_4] : 71-year-old male patient pmhx prostate cancer, s/p radical prostatectomy in 2016. He has a 50-pack-year smoking history. He stopped smoking when he came to the ER in February. He presents for follow up post hospitalization where he was evaluated for chest pain. He was ruled out for acute coronary syndrome. As part of the work-up, a CT chest demonstrated a mediastinal mass, which was biopsied\par His surgeon/urologist is Dr. Fawad De La Cruz at [facility]. \par He denies shortness of breath\par no coughing\par he takes tylenol for the chest pain\par he used to be 202 lbs a few months ago, now 196 lbs. \par He worked as [employer] for 36 years

## 2021-03-18 NOTE — REVIEW OF SYSTEMS
[Negative] : Endocrine [Recent Wt Loss (___ Lbs)] : ~T recent [unfilled] lb weight loss [Chest Discomfort] : chest discomfort [Fever] : no fever [Fatigue] : no fatigue [Poor Appetite] : no poor appetite [Nasal Congestion] : no nasal congestion [Cough] : no cough [Dyspnea] : no dyspnea [SOB on Exertion] : no sob on exertion [Nausea] : no nausea [Vomiting] : no vomiting [Diarrhea] : no diarrhea [Constipation] : no constipation [Rash] : no rash [Headache] : no headache [Dizziness] : no dizziness

## 2021-03-18 NOTE — DISCUSSION/SUMMARY
[FreeTextEntry1] : Attending's Summary\par 3-18-21:\par -no pallor or icterus \par -good air entry bilaterally, no wheezing, rhonchi or crackles\par -normal S1, S2, no murmurs, rubs, gallops \par -significant manifestation of OA \par -1+ pitting edema

## 2021-03-18 NOTE — CONSULT LETTER
[Dear  ___] : Dear  [unfilled], [Consult Letter:] : I had the pleasure of evaluating your patient, [unfilled]. [Please see my note below.] : Please see my note below. [Consult Closing:] : Thank you very much for allowing me to participate in the care of this patient.  If you have any questions, please do not hesitate to contact me. [Sincerely,] : Sincerely, [DrJohny  ___] : Dr. FARRAR [FreeTextEntry2] : Dr. Chema Peres (PCP)\par

## 2021-03-18 NOTE — ASSESSMENT
[FreeTextEntry1] : 3-18-21:\par \par It was a pleasure to see Chalino in follow up today. His respiratory issues are described below:\par \par 1. Mediastinal mass\par Chalino has a [id number] smoking history, recently quit. He came to the hospital a few weeks ago with chest pain. He was ruled out for acute coronary syndrome. As part of the work-up, a CT chest demonstrated a mediastinal mass. PET scan on 2/21/21 showed an SUV 21.3. Cytopathology was positive for malignant cells, Poorly differentiated. The tumor cells are focally positive for CK7, p63, and CDX2, and have a markedly increased Ki-67 index, while they are negative for CK20, TTF1, Napsin-A, synatophysin, chromogranin, NKX3.1\par \par He has a history of prostate cancer, s/p radical prostatectomy in 2016. PSA during hospitalization was 4.53, which  is mildly elevated. The mediastinal mass is not likely to be related to prostate cancer. \par \par Plan:\par - We will refer Chalino to oncologist, Dr. Lea Garnica. I have discussed the need for an urgent consult with Dr. Garnica, who has kindly asked his office to reach out to patient. I am concerned that the mass is encircling the distal trachea and main stem bronchi.\par - The pathology needs to go to [facility] [place]. Dr. Garnica's office will handle this.\par -On today's exam, he has no evidence of upper airways obstruction\par \par 2. Former smoker\par Chalino has a 60-wqdu-ztsd smoking history. He stopped smoking in February 2021.\par \par Plan:\par - He should be considered for the lung cancer screening program in the future\par \par 3. Health Maintenance\par He received both doses of COVID-19 vaccine (Moderna)\par \par Return to clinic: 3 months

## 2021-03-18 NOTE — PROCEDURE
[FreeTextEntry1] : Bronchoscopy on 2/19/21\par Cytopathology Report\par Final Diagnosis\par MEDIASTINAL MASS,EBUS GUIDED, FNA\par POSITIVE FOR MALIGNANT CELLS\par Poorly differentiated carcinoma\par Cell block reviewed\par See comment\par \par Comment: The tumor cells are focally positive for CK7, p63, and CDX2, and have a markedly increased Ki-67 index, while they are negative for CK20, TTF1, Napsin-A, synatophysin, chromogranin,\par NKX3.1. Such an immunohistochemical profile supports the above diagnosis.\par \par Screened by: Roxanne Zepeda CT(ASCP)\par Verified by: NIMA Uribe MD, PhD\par (Electronic Signature)\par Reported on: 02/23/21 17:47 EST, [facility], [address] [place] [address]\par Phone: [phone]   Fax: [phone]\par _________________________________________________________________\par \par \par Specimen(s) Submitted\par MEDIASTINAL MASS,EBUS GUIDED, FNA\par \par Clinical Information\par Smoker, tracheal, right mainstem and mediastinal mass\par Pass 1 Adequate\par Pass 2,3,4,5,6 Cell block\par Immediate assessment reported to Dr. Edward by DEJA on 2/19/2021.\par \par Gross Description\par Received: 1 air dried direct smears, 1 alcohol fixed direct\par smears,  20 ml of bloody fluid received in CytoRich\par Prepared:  1 DQ, 1 PAP, 1 Cell Block\par \par 2/21/21 PET scan\par Impression:\par 1. Redemonstrated lobular soft tissue mediastinal mass which extends into the azygoesophageal recess measuring 0.6 x 6.4 x 14.3 cm (AP by TV by CC dimensions) with SUV 21.3. This abnormality is likely to be a malignancy.\par 2. No lymphadenopathy or evidence of metastasis.\par 3. Since 2/18/2021, new right-sided mild pleural effusion and left-sided trace pleural effusion.\par \par 2/19/21 Echo\par CONCLUSIONS:\par 1. No significant valvular disease.\par 2. Normal left and right ventricular size and systolic function.\par 3. There is a small circumferential pericardial effusion with a moderate to large collection adajcent to the right atrium. There is systolic invagination of the right atrium. Diastolic invagination of the right ventricle not definitive. No significant respiratory changes in mitral inflow velocities. The inferior vena cava is normal in size (<2.1cm) with normal inspiratory collapse (>50%) consistent with normal right atrial pressure (\par 3, range 0-5mmHg). Cardiac tamponade is a clinical diagnosis. Clinical correlation required.\par 4. No prior echo is available for comparison.\par \par Chest CT 2/18/21\par Impression:\par 1. Large mass/shelli aggregate encasing the jose, right mainstem bronchus and abutting mediastinal vessels as detailed above. Recommend metabolic and histologic assessment for further characterization.\par 2. Small to moderate pericardial effusion.\par 3. Mildly dilated aortic root 4.0 cm.\par 4. Mildly dilated main pulmonary artery which can be seen in pulmonary artery hypertension.

## 2021-03-24 ENCOUNTER — EMERGENCY (EMERGENCY)
Facility: HOSPITAL | Age: 71
LOS: 1 days | Discharge: HOME | End: 2021-03-24
Admitting: EMERGENCY MEDICINE
Payer: MEDICARE

## 2021-03-24 VITALS
DIASTOLIC BLOOD PRESSURE: 75 MMHG | HEIGHT: 68 IN | OXYGEN SATURATION: 97 % | SYSTOLIC BLOOD PRESSURE: 137 MMHG | HEART RATE: 88 BPM | TEMPERATURE: 98 F | RESPIRATION RATE: 18 BRPM

## 2021-03-24 DIAGNOSIS — Z90.79 ACQUIRED ABSENCE OF OTHER GENITAL ORGAN(S): Chronic | ICD-10-CM

## 2021-03-24 DIAGNOSIS — Z01.818 ENCOUNTER FOR OTHER PREPROCEDURAL EXAMINATION: ICD-10-CM

## 2021-03-24 LAB — SARS-COV-2 RNA SPEC QL NAA+PROBE: SIGNIFICANT CHANGE UP

## 2021-03-24 PROCEDURE — 99283 EMERGENCY DEPT VISIT LOW MDM: CPT

## 2021-03-24 PROCEDURE — U0005: CPT

## 2021-03-24 PROCEDURE — U0003: CPT

## 2021-03-24 PROCEDURE — 99282 EMERGENCY DEPT VISIT SF MDM: CPT

## 2021-03-24 NOTE — ED PROVIDER NOTE - PATIENT PORTAL LINK FT
You can access the FollowMyHealth Patient Portal offered by [facility] by registering at the following website: [email or web address]. By joining [facility]’s FollowMyHealth portal, you will also be able to view your health information using other applications (apps) compatible with our system.

## 2021-03-26 ENCOUNTER — RESULT REVIEW (OUTPATIENT)
Age: 71
End: 2021-03-26

## 2021-03-26 ENCOUNTER — OUTPATIENT (OUTPATIENT)
Dept: OUTPATIENT SERVICES | Facility: HOSPITAL | Age: 71
LOS: 1 days | Discharge: HOME | End: 2021-03-26
Payer: MEDICARE

## 2021-03-26 DIAGNOSIS — Z90.79 ACQUIRED ABSENCE OF OTHER GENITAL ORGAN(S): Chronic | ICD-10-CM

## 2021-03-26 LAB — GLUCOSE BLDC GLUCOMTR-MCNC: 88 MG/DL — SIGNIFICANT CHANGE UP (ref 70–99)

## 2021-03-26 PROCEDURE — 88305 TISSUE EXAM BY PATHOLOGIST: CPT | Mod: 26

## 2021-03-26 PROCEDURE — 82962 GLUCOSE BLOOD TEST: CPT

## 2021-03-26 PROCEDURE — 88342 IMHCHEM/IMCYTCHM 1ST ANTB: CPT | Mod: 26,59

## 2021-03-26 PROCEDURE — 88173 CYTOPATH EVAL FNA REPORT: CPT | Mod: 26

## 2021-03-26 PROCEDURE — 31652 BRONCH EBUS SAMPLNG 1/2 NODE: CPT | Mod: RT

## 2021-03-26 PROCEDURE — 88344 IMHCHEM/IMCYTCHM EA MLT ANTB: CPT

## 2021-03-26 PROCEDURE — 88305 TISSUE EXAM BY PATHOLOGIST: CPT

## 2021-03-26 PROCEDURE — 31652 BRONCH EBUS SAMPLNG 1/2 NODE: CPT | Mod: GC

## 2021-03-26 PROCEDURE — 88344 IMHCHEM/IMCYTCHM EA MLT ANTB: CPT | Mod: 26

## 2021-03-26 PROCEDURE — 88173 CYTOPATH EVAL FNA REPORT: CPT

## 2021-03-26 PROCEDURE — 88342 IMHCHEM/IMCYTCHM 1ST ANTB: CPT

## 2021-03-27 DIAGNOSIS — Z79.84 LONG TERM (CURRENT) USE OF ORAL HYPOGLYCEMIC DRUGS: ICD-10-CM

## 2021-03-27 DIAGNOSIS — Z20.822 CONTACT WITH AND (SUSPECTED) EXPOSURE TO COVID-19: ICD-10-CM

## 2021-03-27 DIAGNOSIS — Z79.02 LONG TERM (CURRENT) USE OF ANTITHROMBOTICS/ANTIPLATELETS: ICD-10-CM

## 2021-03-29 LAB — NON-GYNECOLOGICAL CYTOLOGY STUDY: SIGNIFICANT CHANGE UP

## 2021-03-31 ENCOUNTER — OUTPATIENT (OUTPATIENT)
Dept: OUTPATIENT SERVICES | Facility: HOSPITAL | Age: 71
LOS: 1 days | End: 2021-03-31
Payer: MEDICARE

## 2021-03-31 ENCOUNTER — APPOINTMENT (OUTPATIENT)
Dept: MRI IMAGING | Facility: HOSPITAL | Age: 71
End: 2021-03-31

## 2021-03-31 DIAGNOSIS — Z90.79 ACQUIRED ABSENCE OF OTHER GENITAL ORGAN(S): Chronic | ICD-10-CM

## 2021-03-31 PROCEDURE — A9585: CPT

## 2021-03-31 PROCEDURE — 70553 MRI BRAIN STEM W/O & W/DYE: CPT

## 2021-03-31 PROCEDURE — 70553 MRI BRAIN STEM W/O & W/DYE: CPT | Mod: 26

## 2021-04-08 PROBLEM — E78.5 HYPERLIPIDEMIA: Status: ACTIVE | Noted: 2021-04-08

## 2021-04-09 ENCOUNTER — APPOINTMENT (OUTPATIENT)
Dept: RADIATION ONCOLOGY | Facility: CLINIC | Age: 71
End: 2021-04-09
Payer: MEDICARE

## 2021-04-09 VITALS
SYSTOLIC BLOOD PRESSURE: 122 MMHG | HEART RATE: 66 BPM | OXYGEN SATURATION: 98 % | TEMPERATURE: 98.2 F | WEIGHT: 192 LBS | DIASTOLIC BLOOD PRESSURE: 69 MMHG | RESPIRATION RATE: 18 BRPM | BODY MASS INDEX: 29.19 KG/M2

## 2021-04-09 DIAGNOSIS — E78.5 HYPERLIPIDEMIA, UNSPECIFIED: ICD-10-CM

## 2021-04-09 DIAGNOSIS — Z78.9 OTHER SPECIFIED HEALTH STATUS: ICD-10-CM

## 2021-04-09 PROCEDURE — 99204 OFFICE O/P NEW MOD 45 MIN: CPT | Mod: 25

## 2021-04-09 RX ORDER — METFORMIN HYDROCHLORIDE 625 MG/1
TABLET ORAL
Refills: 0 | Status: ACTIVE | COMMUNITY

## 2021-04-09 NOTE — PHYSICAL EXAM
[] : no respiratory distress [Respiration, Rhythm And Depth] : normal respiratory rhythm and effort [Heart Rate And Rhythm] : heart rate and rhythm were normal [Auscultation Breath Sounds / Voice Sounds] : lungs were clear to auscultation bilaterally [Heart Sounds] : normal S1 and S2 [Normal] : oriented to person, place and time, the affect was normal, the mood was normal and not anxious

## 2021-04-09 NOTE — REASON FOR VISIT
[Lung Cancer] : lung cancer [Consideration of Curative Therapy] : consideration of curative therapy for

## 2021-04-22 NOTE — HISTORY OF PRESENT ILLNESS
[FreeTextEntry1] : Mr. Chalino Rosen is a 71 year old male, former heavy smoker (quit Feb 2021), referred by Dr. Garnica for consideration of radiation therapy for a Stage III (A vs. B) (cT4, cNx, M0) right sided NSCLC.  His PMH is notable for prostate cancer, s/p radical prostatectomy in 2016 (managed by Dr. De La Cruz). \par \par Patient presented to [facility] ED on 2/18/21 for chest discomfort.\par \par CT Chest done 2/18/21 revealed the following: \par -Lobular soft tissue mediastinal mass with areas of low density suggesting areas of necrosis measuring 9.6 x 6.4 x 14.3 cm, and encasing the jose, right mainstem bronchus and abutting mediastinal vessels. No axillary, left hilar, or lower cervical adenopathy.\par -Small to moderate pericardial effusion.\par -Mildly dilated aortic root 4.0 cm.\par -Mildly dilated main pulmonary artery which can be seen in pulmonary artery hypertension.\par \par He underwent EBUS on 2/19/21; pathology showed poorly differentiated carcinoma.\par \par PET/CT done 2/21/21 showed the following: \par -Re-demonstrated lobular soft tissue mediastinal mass which extends into the azygoesophageal recess measuring 0.6 x 6.4 x 14.3 cm with SUV 21.3. This abnormality is likely to be a malignancy.\par -No lymphadenopathy or evidence of metastasis.\par -Since 2/18/2021, new right-sided mild pleural effusion and left-sided trace pleural effusion.\par \par He established care with Dr. Garnica on 3/22/21, at which time plan was for brain MRI, EBUS, and referral here to discuss radiation therapy.\par \par EBUS on 3/26/21 showed the following pathology: \par -Poorly differentiated carcinoma. Tumor cells are negative for TTF1, Napsin A and P40 stains. No change in final diagnosis.\par -NGS lung panel and PDL1 pending.\par \par Brain MRI w/wo bill done 3/31/21 showed an approximately 8 mm colloid cyst, and no intracranial metastases.\par \par Today, he notes he continues to feel discomfort in his chest. He also reports decreased appetite that started in January/ February, and he has lost approx 10 pounds. He also reports intermittent cough, but denies hemoptysis, SOB, fever, chills, fatigue, or any further complaints. He denies history of radiation. \par \par He denies any know family history of cancer. \par \par Patient is a former smoker (50 pack years, quit Feb 2021). He lives in [place] with his spouse. He gets to medical appointments via taxi.

## 2021-04-22 NOTE — REVIEW OF SYSTEMS
[Recent Change In Weight] : ~T recent weight change [Cough] : cough [Negative] : Heme/Lymph [Fever] : no fever [Chills] : no chills [Night Sweats] : no night sweats [Fatigue] : no fatigue [Shortness Of Breath] : no shortness of breath [Wheezing] : no wheezing [FreeTextEntry2] : Weight loss (approx 10 pounds) [FreeTextEntry5] : Chest discomfort. [FreeTextEntry7] : +decreased appetite

## 2021-04-28 ENCOUNTER — NON-APPOINTMENT (OUTPATIENT)
Age: 71
End: 2021-04-28

## 2021-05-05 ENCOUNTER — NON-APPOINTMENT (OUTPATIENT)
Age: 71
End: 2021-05-05

## 2021-05-05 VITALS
OXYGEN SATURATION: 97 % | RESPIRATION RATE: 18 BRPM | HEART RATE: 83 BPM | SYSTOLIC BLOOD PRESSURE: 124 MMHG | DIASTOLIC BLOOD PRESSURE: 74 MMHG

## 2021-05-05 NOTE — VITALS
[Maximal Pain Intensity: 0/10] : 0/10 [Least Pain Intensity: 0/10] : 0/10 [NoTreatment Scheduled] : no treatment scheduled [90: Able to carry normal activity; minor signs or symptoms of disease.] : 90: Able to carry normal activity; minor signs or symptoms of disease.

## 2021-05-11 NOTE — VITALS
[Maximal Pain Intensity: 2/10] : 2/10 [Least Pain Intensity: 0/10] : 0/10 [Pain Duration: ___] : Pain duration: [unfilled] [Pain Location: ___] : Pain Location: [unfilled] [NoTreatment Scheduled] : no treatment scheduled [ECOG Performance Status: 1 - Restricted in physically strenuous activity but ambulatory and able to carry out work of a light or sedentary nature] : Performance Status: 1 - Restricted in physically strenuous activity but ambulatory and able to carry out work of a light or sedentary nature, e.g., light house work, office work

## 2021-05-12 ENCOUNTER — NON-APPOINTMENT (OUTPATIENT)
Age: 71
End: 2021-05-12

## 2021-05-12 VITALS
DIASTOLIC BLOOD PRESSURE: 71 MMHG | HEART RATE: 80 BPM | SYSTOLIC BLOOD PRESSURE: 113 MMHG | BODY MASS INDEX: 27.83 KG/M2 | OXYGEN SATURATION: 99 % | WEIGHT: 183 LBS | RESPIRATION RATE: 18 BRPM

## 2021-05-12 NOTE — HISTORY OF PRESENT ILLNESS
[FreeTextEntry1] : Mr. Chalino Rosen is a 71 year old male, former heavy smoker (quit Feb 2021), referred by Dr. Garnica for consideration of radiation therapy for a Stage III (A vs. B) (cT4, cNx, M0) right sided NSCLC.  His PMH is notable for prostate cancer, s/p radical prostatectomy in 2016 (managed by Dr. De La Cruz). \par \par Patient presented to [facility] ED on 2/18/21 for chest discomfort.\par \par CT Chest done 2/18/21 revealed the following: \par -Lobular soft tissue mediastinal mass with areas of low density suggesting areas of necrosis measuring 9.6 x 6.4 x 14.3 cm, and encasing the jose, right mainstem bronchus and abutting mediastinal vessels. No axillary, left hilar, or lower cervical adenopathy.\par -Small to moderate pericardial effusion.\par -Mildly dilated aortic root 4.0 cm.\par -Mildly dilated main pulmonary artery which can be seen in pulmonary artery hypertension.\par \par He underwent EBUS on 2/19/21; pathology showed poorly differentiated carcinoma.\par \par PET/CT done 2/21/21 showed the following: \par -Re-demonstrated lobular soft tissue mediastinal mass which extends into the azygoesophageal recess measuring 0.6 x 6.4 x 14.3 cm with SUV 21.3. This abnormality is likely to be a malignancy.\par -No lymphadenopathy or evidence of metastasis.\par -Since 2/18/2021, new right-sided mild pleural effusion and left-sided trace pleural effusion.\par \par He established care with Dr. Garnica on 3/22/21, at which time plan was for brain MRI, EBUS, and referral here to discuss radiation therapy.\par \par EBUS on 3/26/21 showed the following pathology: \par -Poorly differentiated carcinoma. Tumor cells are negative for TTF1, Napsin A and P40 stains. No change in final diagnosis.\par -NGS lung panel and PDL1 pending.\par \par Brain MRI w/wo bill done 3/31/21 showed an approximately 8 mm colloid cyst, and no intracranial metastases.\par \par Today, he notes he continues to feel discomfort in his chest. He also reports decreased appetite that started in January/ February, and he has lost approx 10 pounds. He also reports intermittent cough, but denies hemoptysis, SOB, fever, chills, fatigue, or any further complaints. He denies history of radiation. \par \par He denies any know family history of cancer. \par \par Patient is a former smoker (50 pack years, quit Feb 2021). He lives in [place] with his spouse. He gets to medical appointments via taxi.

## 2021-05-12 NOTE — REVIEW OF SYSTEMS
[Recent Change In Weight] : ~T recent weight change [Cough] : cough [Negative] : Heme/Lymph [Dysphagia: Grade 0] : Dysphagia: Grade 0 [Fatigue: Grade 0] : Fatigue: Grade 0 [Cough: Grade 1 - Mild symptoms; nonprescription intervention indicated] : Cough: Grade 1 - Mild symptoms; nonprescription intervention indicated [Dyspnea: Grade 0] : Dyspnea: Grade 0 [Skin Hyperpigmentation: Grade 0] : Skin Hyperpigmentation: Grade 0 [FreeTextEntry1] : productive cough - white sputum  [Fever] : no fever [Chills] : no chills [Night Sweats] : no night sweats [Fatigue] : no fatigue [Shortness Of Breath] : no shortness of breath [Wheezing] : no wheezing [FreeTextEntry2] : Weight loss (approx 10 pounds) [FreeTextEntry5] : Chest discomfort. [FreeTextEntry7] : +decreased appetite

## 2021-05-12 NOTE — PHYSICAL EXAM
[Sclera] : the sclera and conjunctiva were normal [Extraocular Movements] : extraocular movements were intact [Outer Ear] : the ears and nose were normal in appearance [Hearing Threshold Finger Rub Not [place]] : hearing was normal [] : no respiratory distress [Respiration, Rhythm And Depth] : normal respiratory rhythm and effort [Auscultation Breath Sounds / Voice Sounds] : lungs were clear to auscultation bilaterally [Heart Rate And Rhythm] : heart rate and rhythm were normal [Heart Sounds] : normal S1 and S2 [Normal] : normal skin color and pigmentation and no rash

## 2021-05-13 ENCOUNTER — NON-APPOINTMENT (OUTPATIENT)
Age: 71
End: 2021-05-13

## 2021-05-18 ENCOUNTER — NON-APPOINTMENT (OUTPATIENT)
Age: 71
End: 2021-05-18

## 2021-05-19 ENCOUNTER — NON-APPOINTMENT (OUTPATIENT)
Age: 71
End: 2021-05-19

## 2021-05-19 VITALS
HEART RATE: 65 BPM | WEIGHT: 182.4 LBS | DIASTOLIC BLOOD PRESSURE: 76 MMHG | SYSTOLIC BLOOD PRESSURE: 113 MMHG | RESPIRATION RATE: 18 BRPM | OXYGEN SATURATION: 100 % | BODY MASS INDEX: 27.73 KG/M2

## 2021-05-19 NOTE — REVIEW OF SYSTEMS
[Recent Change In Weight] : ~T recent weight change [Cough] : cough [Negative] : Heme/Lymph [Dyspnea: Grade 1 - Shortness of breath with moderate exertion] : Dyspnea: Grade 1 - Shortness of breath with moderate exertion [Fever] : no fever [Chills] : no chills [Night Sweats] : no night sweats [Fatigue] : no fatigue [Shortness Of Breath] : no shortness of breath [Wheezing] : no wheezing [FreeTextEntry5] : Chest discomfort. [FreeTextEntry2] : Weight loss (approx 10 pounds) [FreeTextEntry7] : +decreased appetite

## 2021-05-19 NOTE — DISEASE MANAGEMENT
[Clinical] : TNM Stage: c [IIIA] : IIIA [TTNM] : 4 [NTNM] : x [MTNM] : 0 [de-identified] : 1200 cGy [de-identified] : 6000 cGy [de-identified] : right lung

## 2021-05-19 NOTE — HISTORY OF PRESENT ILLNESS
[FreeTextEntry1] : 5/12/21 - OTV- Mr. Rosen has completed 1200 cGy/ 6000 cGy to the right lung.  Today, he notes he is stable since last week. Continues to note a cough at times, clear sputum. Denies hemoptysis. He also notes SOB with exertion and is having difficulty climbing stairs (he [place] the subway here). His appetite is good, but he has lost approx 9 pounds in the past month. He was previously drinking Ensure, but his insurance does not cover it. He denies dysphagia. Denies skin irritation. He is scheduled to follow up with Dr. Garnica tomorrow and resumed chemotherapy.  will continue chemoRT as planned.\par \par 5/5/21 OTV - \par Mr Rosen presents today for his first OTV. HE has received 2/30 fractions for 400 cGy / 600 cGy. Patient states he feels well overall. Denies chest pain or pressure. Patient c/o prductive couhg, white sputum with some difficulty in expectorating the sputum. Denies blood tinged. Appetite good. No other complaints noted. \par __________________\par INITIAL EVAL (4/9/21)\par Mr. Chalino Rosen is a 71 year old male, former heavy smoker (quit Feb 2021), referred by Dr. Garnica for consideration of radiation therapy for a Stage III (A vs. B) (cT4, cNx, M0) right sided NSCLC.  His PMH is notable for prostate cancer, s/p radical prostatectomy in 2016 (managed by Dr. De La Cruz). \par \par Patient presented to [facility] ED on 2/18/21 for chest discomfort.\par \par CT Chest done 2/18/21 revealed the following: \par -Lobular soft tissue mediastinal mass with areas of low density suggesting areas of necrosis measuring 9.6 x 6.4 x 14.3 cm, and encasing the jose, right mainstem bronchus and abutting mediastinal vessels. No axillary, left hilar, or lower cervical adenopathy.\par -Small to moderate pericardial effusion.\par -Mildly dilated aortic root 4.0 cm.\par -Mildly dilated main pulmonary artery which can be seen in pulmonary artery hypertension.\par \par He underwent EBUS on 2/19/21; pathology showed poorly differentiated carcinoma.\par \par PET/CT done 2/21/21 showed the following: \par -Re-demonstrated lobular soft tissue mediastinal mass which extends into the azygoesophageal recess measuring 0.6 x 6.4 x 14.3 cm with SUV 21.3. This abnormality is likely to be a malignancy.\par -No lymphadenopathy or evidence of metastasis.\par -Since 2/18/2021, new right-sided mild pleural effusion and left-sided trace pleural effusion.\par \par He established care with Dr. Garnica on 3/22/21, at which time plan was for brain MRI, EBUS, and referral here to discuss radiation therapy.\par \par EBUS on 3/26/21 showed the following pathology: \par -Poorly differentiated carcinoma. Tumor cells are negative for TTF1, Napsin A and P40 stains. No change in final diagnosis.\par -NGS lung panel and PDL1 pending.\par \par Brain MRI w/wo bill done 3/31/21 showed an approximately 8 mm colloid cyst, and no intracranial metastases.\par \par Today, he notes he continues to feel discomfort in his chest. He also reports decreased appetite that started in January/ February, and he has lost approx 10 pounds. He also reports intermittent cough, but denies hemoptysis, SOB, fever, chills, fatigue, or any further complaints. He denies history of radiation. \par \par He denies any know family history of cancer. \par \par Patient is a former smoker (50 pack years, quit Feb 2021). He lives in [place] with his spouse. He gets to medical appointments via taxi.

## 2021-05-19 NOTE — VITALS
[Least Pain Intensity: 0/10] : 0/10 [Pain Duration: ___] : Pain duration: [unfilled] [Pain Location: ___] : Pain Location: [unfilled] [NoTreatment Scheduled] : no treatment scheduled [ECOG Performance Status: 1 - Restricted in physically strenuous activity but ambulatory and able to carry out work of a light or sedentary nature] : Performance Status: 1 - Restricted in physically strenuous activity but ambulatory and able to carry out work of a light or sedentary nature, e.g., light house work, office work [Maximal Pain Intensity: 1/10] : 1/10 [80: Normal activity with effort; some signs or symptoms of disease.] : 80: Normal activity with effort; some signs or symptoms of disease.

## 2021-05-24 VITALS
BODY MASS INDEX: 28.04 KG/M2 | RESPIRATION RATE: 17 BRPM | DIASTOLIC BLOOD PRESSURE: 81 MMHG | SYSTOLIC BLOOD PRESSURE: 119 MMHG | OXYGEN SATURATION: 100 % | TEMPERATURE: 97.7 F | HEART RATE: 65 BPM | WEIGHT: 185 LBS | HEIGHT: 68 IN

## 2021-05-24 DIAGNOSIS — K20.80 OTHER ESOPHAGITIS WITHOUT BLEEDING: ICD-10-CM

## 2021-05-24 DIAGNOSIS — T66.XXXA OTHER ESOPHAGITIS WITHOUT BLEEDING: ICD-10-CM

## 2021-05-24 RX ORDER — LIDOCAINE HYDROCHLORIDE 20 MG/ML
2 SOLUTION ORAL; TOPICAL
Qty: 500 | Refills: 0 | Status: ACTIVE | COMMUNITY
Start: 2021-05-24 | End: 1900-01-01

## 2021-05-25 RX ORDER — GABAPENTIN 300 MG/1
300 CAPSULE ORAL
Qty: 90 | Refills: 0 | Status: ACTIVE | COMMUNITY
Start: 2021-05-25 | End: 1900-01-01

## 2021-05-25 RX ORDER — OMEPRAZOLE 40 MG/1
40 CAPSULE, DELAYED RELEASE ORAL
Qty: 30 | Refills: 3 | Status: ACTIVE | COMMUNITY
Start: 2021-05-25 | End: 1900-01-01

## 2021-05-26 ENCOUNTER — NON-APPOINTMENT (OUTPATIENT)
Age: 71
End: 2021-05-26

## 2021-05-26 VITALS
WEIGHT: 184 LBS | HEART RATE: 62 BPM | OXYGEN SATURATION: 99 % | BODY MASS INDEX: 27.98 KG/M2 | SYSTOLIC BLOOD PRESSURE: 104 MMHG | RESPIRATION RATE: 18 BRPM | DIASTOLIC BLOOD PRESSURE: 76 MMHG | TEMPERATURE: 98.2 F

## 2021-05-31 NOTE — DISEASE MANAGEMENT
[Clinical] : TNM Stage: c [IIIA] : IIIA [TTNM] : 4 [NTNM] : x [MTNM] : 0 [de-identified] : 3200 cGy [de-identified] : 6000 cGy [de-identified] : right lung

## 2021-05-31 NOTE — HISTORY OF PRESENT ILLNESS
[FreeTextEntry1] : 5/26/21- OTV- Mr. Rosen has completed 3200 cGy/ 6000 cGy to the right lung. He presented to clinic on Monday due to c/o "burning" with swallowing and decreased PO intake. He was started on Lidocaine viscous, and advised to eat soft foods. Yesterday he was started on Omeprazole and Gabapentin (300 mg each night currently, to be uptitrated). Today, he notes the burning has improved and he is eating well. Weight is up two pounds since last week. Continues to note a cough at times, clear sputum. Denies hemoptysis. He also notes SOB with exertion that is stable. Reports good energy. Denies skin irritation. Chemo as per Dr. Garnica. Blood work results from 5/21 with Dr. Garnica reviewed.  He notes he feels very well and will continue chemoRT as planned.\par \par 5/19/21 - OTV- Mr. Rosen has completed 2200 cGy/ 6000 cGy to the right lung. Today, he states he is feeling OK, and trying to eat and drink as usual. He states he has a dry non-productive cough, and he is not taking any OTC cough suppressants for this. He denies chest pain, or SOB , but does experience dyspnea on exertion when climbing steps and continues with RT as prescribed. SW contacted patient last week regarding patient's concerns about transportation and insurance coverage for Ensure. Weight loss seems to have stabilized and patient has not had weight loss this week\par \par 5/12/21 - OTV- Mr. Rosen has completed 1200 cGy/ 6000 cGy to the right lung.  Today, he notes he is stable since last week. Continues to note a cough at times, clear sputum. Denies hemoptysis. He also notes SOB with exertion and is having difficulty climbing stairs ([place] the subway here). His appetite is good, but he has lost approx 9 pounds in the past month. He was previously drinking Ensure, but his insurance does not cover it. He denies dysphagia. Denies skin irritation. He is scheduled to follow up with Dr. Garnica tomorrow and resumed chemotherapy.  will continue chemoRT as planned.\par \par 5/5/21 OTV - \par Mr Rosen presents today for his first OTV. HE has received 2/30 fractions for 400 cGy / 600 cGy. Patient states he feels well overall. Denies chest pain or pressure. Patient c/o prductive couhg, white sputum with some difficulty in expectorating the sputum. Denies blood tinged. Appetite good. No other complaints noted. \par __________________\par INITIAL EVAL (4/9/21)\par Mr. Chalino Rosen is a 71 year old male, former heavy smoker (quit Feb 2021), referred by Dr. Garnica for consideration of radiation therapy for a Stage III (A vs. B) (cT4, cNx, M0) right sided NSCLC.  His PMH is notable for prostate cancer, s/p radical prostatectomy in 2016 (managed by Dr. De La Cruz). \par \par Patient presented to [facility] ED on 2/18/21 for chest discomfort.\par \par CT Chest done 2/18/21 revealed the following: \par -Lobular soft tissue mediastinal mass with areas of low density suggesting areas of necrosis measuring 9.6 x 6.4 x 14.3 cm, and encasing the jose, right mainstem bronchus and abutting mediastinal vessels. No axillary, left hilar, or lower cervical adenopathy.\par -Small to moderate pericardial effusion.\par -Mildly dilated aortic root 4.0 cm.\par -Mildly dilated main pulmonary artery which can be seen in pulmonary artery hypertension.\par \par He underwent EBUS on 2/19/21; pathology showed poorly differentiated carcinoma.\par \par PET/CT done 2/21/21 showed the following: \par -Re-demonstrated lobular soft tissue mediastinal mass which extends into the azygoesophageal recess measuring 0.6 x 6.4 x 14.3 cm with SUV 21.3. This abnormality is likely to be a malignancy.\par -No lymphadenopathy or evidence of metastasis.\par -Since 2/18/2021, new right-sided mild pleural effusion and left-sided trace pleural effusion.\par \par He established care with Dr. Garnica on 3/22/21, at which time plan was for brain MRI, EBUS, and referral here to discuss radiation therapy.\par \par EBUS on 3/26/21 showed the following pathology: \par -Poorly differentiated carcinoma. Tumor cells are negative for TTF1, Napsin A and P40 stains. No change in final diagnosis.\par -NGS lung panel and PDL1 pending.\par \par Brain MRI w/wo bill done 3/31/21 showed an approximately 8 mm colloid cyst, and no intracranial metastases.\par \par Today, he notes he continues to feel discomfort in his chest. He also reports decreased appetite that started in January/ February, and he has lost approx 10 pounds. He also reports intermittent cough, but denies hemoptysis, SOB, fever, chills, fatigue, or any further complaints. He denies history of radiation. \par \par He denies any know family history of cancer. \par \par Patient is a former smoker (50 pack years, quit Feb 2021). He lives in [place] with his spouse. He gets to medical appointments via taxi.

## 2021-05-31 NOTE — HISTORY OF PRESENT ILLNESS
[FreeTextEntry1] : 5/19/21 - OTV- Mr. Rosen has completed 2200 cGy/ 6000 cGy to the right lung.  Today, he states he is feeling OK, and trying to eat and drink as usual. He states he has a dry non-productive cough, and he is not taking any OTC cough suppressants for this. He denies chest pain, or SOB , but does experience dyspnea on exertion when climbing steps and continues with RT as prescribed.  SW contacted patient last week regarding patient's concerns about transportation and insurance coverage for Ensure. Weight loss seems to have stabilized and patient has not had weight loss this week.  will continue chemoRT as planned.\par \par 5/12/21 - OTV- Mr. Rosen has completed 1200 cGy/ 6000 cGy to the right lung.  Today, he notes he is stable since last week. Continues to note a cough at times, clear sputum. Denies hemoptysis. He also notes SOB with exertion and is having difficulty climbing stairs (he [place] the subway here). His appetite is good, but he has lost approx 9 pounds in the past month. He was previously drinking Ensure, but his insurance does not cover it. He denies dysphagia. Denies skin irritation. He is scheduled to follow up with Dr. Garnica tomorrow. He started chemotherapy last week.\par \par 5/5/21 OTV - \par Mr Rosen presents today for his first OTV. HE has received 2/30 fractions for 400 cGy / 600 cGy. Patient states he feels well overall. Denies chest pain or pressure. Patient c/o prductive couhg, white sputum with some difficulty in expectorating the sputum. Denies blood tinged. Appetite good. No other complaints noted. \par __________________\par INITIAL EVAL (4/9/21)\par Mr. Chalino Rosen is a 71 year old male, former heavy smoker (quit Feb 2021), referred by Dr. Garnica for consideration of radiation therapy for a Stage III (A vs. B) (cT4, cNx, M0) right sided NSCLC.  His PMH is notable for prostate cancer, s/p radical prostatectomy in 2016 (managed by Dr. De La Cruz). \par \par Patient presented to [facility] ED on 2/18/21 for chest discomfort.\par \par CT Chest done 2/18/21 revealed the following: \par -Lobular soft tissue mediastinal mass with areas of low density suggesting areas of necrosis measuring 9.6 x 6.4 x 14.3 cm, and encasing the jose, right mainstem bronchus and abutting mediastinal vessels. No axillary, left hilar, or lower cervical adenopathy.\par -Small to moderate pericardial effusion.\par -Mildly dilated aortic root 4.0 cm.\par -Mildly dilated main pulmonary artery which can be seen in pulmonary artery hypertension.\par \par He underwent EBUS on 2/19/21; pathology showed poorly differentiated carcinoma.\par \par PET/CT done 2/21/21 showed the following: \par -Re-demonstrated lobular soft tissue mediastinal mass which extends into the azygoesophageal recess measuring 0.6 x 6.4 x 14.3 cm with SUV 21.3. This abnormality is likely to be a malignancy.\par -No lymphadenopathy or evidence of metastasis.\par -Since 2/18/2021, new right-sided mild pleural effusion and left-sided trace pleural effusion.\par \par He established care with Dr. Garnica on 3/22/21, at which time plan was for brain MRI, EBUS, and referral here to discuss radiation therapy.\par \par EBUS on 3/26/21 showed the following pathology: \par -Poorly differentiated carcinoma. Tumor cells are negative for TTF1, Napsin A and P40 stains. No change in final diagnosis.\par -NGS lung panel and PDL1 pending.\par \par Brain MRI w/wo bill done 3/31/21 showed an approximately 8 mm colloid cyst, and no intracranial metastases.\par \par Today, he notes he continues to feel discomfort in his chest. He also reports decreased appetite that started in January/ February, and he has lost approx 10 pounds. He also reports intermittent cough, but denies hemoptysis, SOB, fever, chills, fatigue, or any further complaints. He denies history of radiation. \par \par He denies any know family history of cancer. \par \par Patient is a former smoker (50 pack years, quit Feb 2021). He lives in [place] with his spouse. He gets to medical appointments via taxi.

## 2021-05-31 NOTE — DISEASE MANAGEMENT
[Clinical] : TNM Stage: c [IIIA] : IIIA [TTNM] : 4 [NTNM] : x [MTNM] : 0 [de-identified] : 2200 cGy [de-identified] : 6000 cGy [de-identified] : right lung

## 2021-05-31 NOTE — REVIEW OF SYSTEMS
[Dyspnea: Grade 1 - Shortness of breath with moderate exertion] : Dyspnea: Grade 1 - Shortness of breath with moderate exertion [Recent Change In Weight] : ~T recent weight change [Cough] : cough [Negative] : Heme/Lymph [Esophagitis: Grade 2 - Symptomatic; altered eating/swallowing;  oral supplements indicated] : Esophagitis: Grade 2 - Symptomatic; altered eating/swallowing;  oral supplements indicated [Fatigue: Grade 0] : Fatigue: Grade 0 [Fever] : no fever [Chills] : no chills [Night Sweats] : no night sweats [Shortness Of Breath] : no shortness of breath [Fatigue] : no fatigue [Wheezing] : no wheezing [FreeTextEntry2] : Weight loss (approx 10 pounds) [FreeTextEntry5] : Chest discomfort. [FreeTextEntry7] : +decreased appetite

## 2021-05-31 NOTE — REVIEW OF SYSTEMS
[Recent Change In Weight] : ~T recent weight change [Cough] : cough [Negative] : Heme/Lymph [Fatigue: Grade 0] : Fatigue: Grade 0 [Cough: Grade 1 - Mild symptoms; nonprescription intervention indicated] : Cough: Grade 1 - Mild symptoms; nonprescription intervention indicated [Dyspnea: Grade 0] : Dyspnea: Grade 0 [Skin Hyperpigmentation: Grade 0] : Skin Hyperpigmentation: Grade 0 [FreeTextEntry3] : denies fatigue [Fever] : no fever [Chills] : no chills [Night Sweats] : no night sweats [Fatigue] : no fatigue [Shortness Of Breath] : no shortness of breath [Wheezing] : no wheezing [FreeTextEntry2] : Weight loss (approx 10 pounds) [FreeTextEntry5] : Chest discomfort. [FreeTextEntry7] : +decreased appetite

## 2021-06-02 ENCOUNTER — NON-APPOINTMENT (OUTPATIENT)
Age: 71
End: 2021-06-02

## 2021-06-02 VITALS
HEART RATE: 70 BPM | SYSTOLIC BLOOD PRESSURE: 110 MMHG | BODY MASS INDEX: 27.67 KG/M2 | WEIGHT: 182 LBS | RESPIRATION RATE: 17 BRPM | DIASTOLIC BLOOD PRESSURE: 77 MMHG | OXYGEN SATURATION: 99 % | TEMPERATURE: 98.3 F

## 2021-06-02 NOTE — PHYSICAL EXAM
[Sclera] : the sclera and conjunctiva were normal [Extraocular Movements] : extraocular movements were intact [Outer Ear] : the ears and nose were normal in appearance [Hearing Threshold Finger Rub Not [place]] : hearing was normal [Oropharynx] : The oropharynx was normal [] : no respiratory distress [Respiration, Rhythm And Depth] : normal respiratory rhythm and effort [Auscultation Breath Sounds / Voice Sounds] : lungs were clear to auscultation bilaterally [Heart Rate And Rhythm] : heart rate and rhythm were normal [Heart Sounds] : normal S1 and S2 [Normal] : oriented to person, place and time, the affect was normal, the mood was normal and not anxious

## 2021-06-08 NOTE — HISTORY OF PRESENT ILLNESS
[FreeTextEntry1] : 6/2/21- OTV- Mr. Rosen has completed 3800 cGy/ 6000 cGy to the right lung. Today, he notes he feels great, no longer has burning/ pain with swallowing. He reports he is eating well, no decrease in PO intake, weight stable. Denies cough or hemoptysis. SOB with exertion is stable. Reports good energy. Denies skin irritation. continue chemoRT as planned.\par \par 5/26/21- OTV- Mr. Rosen has completed 3200 cGy/ 6000 cGy to the right lung. He presented to clinic on Monday due to c/o "burning" with swallowing and decreased PO intake. He was started on Lidocaine viscous, and advised to eat soft foods. Yesterday he was started on Omeprazole and Gabapentin (300 mg each night currently, to be uptitrated). Today, he notes the burning has improved and he is eating well. Weight is up two pounds since last week. Continues to note a cough at times, clear sputum. Denies hemoptysis. He also notes SOB with exertion that is stable. Reports good energy. Denies skin irritation. Chemo as per Dr. Garnica. Blood work results from 5/21 with Dr. Garnica reviewed.  He notes he feels very well and will continue chemoRT as planned.\par \par 5/19/21 - OTV- Mr. Rosen has completed 2200 cGy/ 6000 cGy to the right lung. Today, he states he is feeling OK, and trying to eat and drink as usual. He states he has a dry non-productive cough, and he is not taking any OTC cough suppressants for this. He denies chest pain, or SOB , but does experience dyspnea on exertion when climbing steps and continues with RT as prescribed. SW contacted patient last week regarding patient's concerns about transportation and insurance coverage for Ensure. Weight loss seems to have stabilized and patient has not had weight loss this week\par \par 5/12/21 - OTV- Mr. Rosen has completed 1200 cGy/ 6000 cGy to the right lung.  Today, he notes he is stable since last week. Continues to note a cough at times, clear sputum. Denies hemoptysis. He also notes SOB with exertion and is having difficulty climbing stairs ([place] the subway here). His appetite is good, but he has lost approx 9 pounds in the past month. He was previously drinking Ensure, but his insurance does not cover it. He denies dysphagia. Denies skin irritation. He is scheduled to follow up with Dr. Garnica tomorrow and resumed chemotherapy.  will continue chemoRT as planned.\par \par 5/5/21 OTV - \par Mr Rosen presents today for his first OTV. HE has received 2/30 fractions for 400 cGy / 600 cGy. Patient states he feels well overall. Denies chest pain or pressure. Patient c/o prductive couhg, white sputum with some difficulty in expectorating the sputum. Denies blood tinged. Appetite good. No other complaints noted. \par __________________\par INITIAL EVAL (4/9/21)\par Mr. Chalino Rosen is a 71 year old male, former heavy smoker (quit Feb 2021), referred by Dr. Garnica for consideration of radiation therapy for a Stage III (A vs. B) (cT4, cNx, M0) right sided NSCLC.  His PMH is notable for prostate cancer, s/p radical prostatectomy in 2016 (managed by Dr. De La Cruz). \par \par Patient presented to [facility] ED on 2/18/21 for chest discomfort.\par \par CT Chest done 2/18/21 revealed the following: \par -Lobular soft tissue mediastinal mass with areas of low density suggesting areas of necrosis measuring 9.6 x 6.4 x 14.3 cm, and encasing the jose, right mainstem bronchus and abutting mediastinal vessels. No axillary, left hilar, or lower cervical adenopathy.\par -Small to moderate pericardial effusion.\par -Mildly dilated aortic root 4.0 cm.\par -Mildly dilated main pulmonary artery which can be seen in pulmonary artery hypertension.\par \par He underwent EBUS on 2/19/21; pathology showed poorly differentiated carcinoma.\par \par PET/CT done 2/21/21 showed the following: \par -Re-demonstrated lobular soft tissue mediastinal mass which extends into the azygoesophageal recess measuring 0.6 x 6.4 x 14.3 cm with SUV 21.3. This abnormality is likely to be a malignancy.\par -No lymphadenopathy or evidence of metastasis.\par -Since 2/18/2021, new right-sided mild pleural effusion and left-sided trace pleural effusion.\par \par He established care with Dr. Garnica on 3/22/21, at which time plan was for brain MRI, EBUS, and referral here to discuss radiation therapy.\par \par EBUS on 3/26/21 showed the following pathology: \par -Poorly differentiated carcinoma. Tumor cells are negative for TTF1, Napsin A and P40 stains. No change in final diagnosis.\par -NGS lung panel and PDL1 pending.\par \par Brain MRI w/wo bill done 3/31/21 showed an approximately 8 mm colloid cyst, and no intracranial metastases.\par \par Today, he notes he continues to feel discomfort in his chest. He also reports decreased appetite that started in January/ February, and he has lost approx 10 pounds. He also reports intermittent cough, but denies hemoptysis, SOB, fever, chills, fatigue, or any further complaints. He denies history of radiation. \par \par He denies any know family history of cancer. \par \par Patient is a former smoker (50 pack years, quit Feb 2021). He lives in [place] with his spouse. He gets to medical appointments via taxi.

## 2021-06-08 NOTE — PHYSICAL EXAM
[Sclera] : the sclera and conjunctiva were normal [Extraocular Movements] : extraocular movements were intact [Outer Ear] : the ears and nose were normal in appearance [Hearing Threshold Finger Rub Not [place]] : hearing was normal [Oropharynx] : The oropharynx was normal [] : no respiratory distress [Respiration, Rhythm And Depth] : normal respiratory rhythm and effort [Auscultation Breath Sounds / Voice Sounds] : lungs were clear to auscultation bilaterally [Heart Rate And Rhythm] : heart rate and rhythm were normal [Heart Sounds] : normal S1 and S2 [Normal] : oriented to person, place and time, the affect was normal, the mood was normal and not anxious

## 2021-06-08 NOTE — REVIEW OF SYSTEMS
[Esophagitis: Grade 2 - Symptomatic; altered eating/swallowing;  oral supplements indicated] : Esophagitis: Grade 2 - Symptomatic; altered eating/swallowing;  oral supplements indicated [Fatigue: Grade 0] : Fatigue: Grade 0 [Dyspnea: Grade 1 - Shortness of breath with moderate exertion] : Dyspnea: Grade 1 - Shortness of breath with moderate exertion [Recent Change In Weight] : ~T recent weight change [Cough] : cough [Negative] : Heme/Lymph [Fever] : no fever [Chills] : no chills [Night Sweats] : no night sweats [Fatigue] : no fatigue [Shortness Of Breath] : no shortness of breath [Wheezing] : no wheezing [FreeTextEntry2] : Weight loss (approx 10 pounds) [FreeTextEntry5] : Chest discomfort. [FreeTextEntry7] : +decreased appetite

## 2021-06-08 NOTE — DISEASE MANAGEMENT
[Clinical] : TNM Stage: c [IIIA] : IIIA [TTNM] : 4 [NTNM] : x [MTNM] : 0 [de-identified] : 3800 cGy [de-identified] : 6000 cGy [de-identified] : right lung

## 2021-06-09 ENCOUNTER — NON-APPOINTMENT (OUTPATIENT)
Age: 71
End: 2021-06-09

## 2021-06-09 VITALS
SYSTOLIC BLOOD PRESSURE: 120 MMHG | HEART RATE: 64 BPM | WEIGHT: 187 LBS | BODY MASS INDEX: 28.43 KG/M2 | OXYGEN SATURATION: 99 % | TEMPERATURE: 98.1 F | RESPIRATION RATE: 18 BRPM | DIASTOLIC BLOOD PRESSURE: 72 MMHG

## 2021-06-09 NOTE — VITALS
[Least Pain Intensity: 0/10] : 0/10 [NoTreatment Scheduled] : no treatment scheduled [ECOG Performance Status: 1 - Restricted in physically strenuous activity but ambulatory and able to carry out work of a light or sedentary nature] : Performance Status: 1 - Restricted in physically strenuous activity but ambulatory and able to carry out work of a light or sedentary nature, e.g., light house work, office work [Maximal Pain Intensity: 0/10] : 0/10

## 2021-06-16 ENCOUNTER — NON-APPOINTMENT (OUTPATIENT)
Age: 71
End: 2021-06-16

## 2021-06-16 VITALS
DIASTOLIC BLOOD PRESSURE: 81 MMHG | WEIGHT: 188.4 LBS | BODY MASS INDEX: 28.65 KG/M2 | HEART RATE: 62 BPM | OXYGEN SATURATION: 99 % | RESPIRATION RATE: 20 BRPM | SYSTOLIC BLOOD PRESSURE: 135 MMHG

## 2021-06-16 NOTE — DISEASE MANAGEMENT
[Clinical] : TNM Stage: c [IIIA] : IIIA [TTNM] : 4 [NTNM] : x [MTNM] : 0 [de-identified] : 5800 cGy [de-identified] : 6000 cGy [de-identified] : right lung

## 2021-06-16 NOTE — HISTORY OF PRESENT ILLNESS
[FreeTextEntry1] : 6/16/21 OTV Mr Rosen is feeling well overall today. Denies cough, states is has completely resolved. Denies shortness of breath or dyspnea on exertion. He has PTE appointment scheduled for 7/22 and has been given discharge instruction packet and all questions answered. PAtient was also seen by nutritionist today. \par \par 6/9/21- OTV- Mr. Rosen has completed 4800 cGy/ 6000 cGy to the right lung. Today, he notes he continues to feel very well. He denies pain/ burning with swallowing and is eating well. Continues to take Gabepentin and Lidocaine viscous. He has gained weight since last week. Denies cough or hemoptysis. SOB with exertion is stable. Reports good energy. Denies skin irritation. Chemo as per Dr. Garnica.\par \par 6/2/21- OTV- Mr. Rosen has completed 3800 cGy/ 6000 cGy to the right lung. Today, he notes he feels great, no longer has burning/ pain with swallowing. He reports he is eating well, no decrease in PO intake, weight stable. Denies cough or hemoptysis. SOB with exertion is stable. Reports good energy. Denies skin irritation. Chemo as per Dr. Garnica.\par \par 5/26/21- OTV- Mr. Rosen has completed 3200 cGy/ 6000 cGy to the right lung. He presented to clinic on Monday due to c/o "burning" with swallowing and decreased PO intake. He was started on Lidocaine viscous, and advised to eat soft foods. Yesterday he was started on Omeprazole and Gabapentin (300 mg each night currently, to be uptitrated). Today, he notes the burning has improved and he is eating well. Weight is up two pounds since last week. Continues to note a cough at times, clear sputum. Denies hemoptysis. He also notes SOB with exertion that is stable. Reports good energy. Denies skin irritation. Chemo as per Dr. Garnica. Blood work results from 5/21 with Dr. Garnica reviewed.  He notes he feels very well and will continue chemoRT as planned.\par \par 5/19/21 - OTV- Mr. Rosen has completed 2200 cGy/ 6000 cGy to the right lung. Today, he states he is feeling OK, and trying to eat and drink as usual. He states he has a dry non-productive cough, and he is not taking any OTC cough suppressants for this. He denies chest pain, or SOB , but does experience dyspnea on exertion when climbing steps and continues with RT as prescribed. SW contacted patient last week regarding patient's concerns about transportation and insurance coverage for Ensure. Weight loss seems to have stabilized and patient has not had weight loss this week\par \par 5/12/21 - OTV- Mr. Rosen has completed 1200 cGy/ 6000 cGy to the right lung.  Today, he notes he is stable since last week. Continues to note a cough at times, clear sputum. Denies hemoptysis. He also notes SOB with exertion and is having difficulty climbing stairs (he [place] the subway here). His appetite is good, but he has lost approx 9 pounds in the past month. He was previously drinking Ensure, but his insurance does not cover it. He denies dysphagia. Denies skin irritation. He is scheduled to follow up with Dr. Garnica tomorrow and resumed chemotherapy.  will continue chemoRT as planned.\par \par 5/5/21 OTV - \par Mr Rosen presents today for his first OTV. HE has received 2/30 fractions for 400 cGy / 600 cGy. Patient states he feels well overall. Denies chest pain or pressure. Patient c/o prductive couhg, white sputum with some difficulty in expectorating the sputum. Denies blood tinged. Appetite good. No other complaints noted. \par __________________\par INITIAL EVAL (4/9/21)\par Mr. Chalino Rosen is a 71 year old male, former heavy smoker (quit Feb 2021), referred by Dr. Garnica for consideration of radiation therapy for a Stage III (A vs. B) (cT4, cNx, M0) right sided NSCLC.  His PMH is notable for prostate cancer, s/p radical prostatectomy in 2016 (managed by Dr. De La Cruz). \par \par Patient presented to [facility] ED on 2/18/21 for chest discomfort.\par \par CT Chest done 2/18/21 revealed the following: \par -Lobular soft tissue mediastinal mass with areas of low density suggesting areas of necrosis measuring 9.6 x 6.4 x 14.3 cm, and encasing the jose, right mainstem bronchus and abutting mediastinal vessels. No axillary, left hilar, or lower cervical adenopathy.\par -Small to moderate pericardial effusion.\par -Mildly dilated aortic root 4.0 cm.\par -Mildly dilated main pulmonary artery which can be seen in pulmonary artery hypertension.\par \par He underwent EBUS on 2/19/21; pathology showed poorly differentiated carcinoma.\par \par PET/CT done 2/21/21 showed the following: \par -Re-demonstrated lobular soft tissue mediastinal mass which extends into the azygoesophageal recess measuring 0.6 x 6.4 x 14.3 cm with SUV 21.3. This abnormality is likely to be a malignancy.\par -No lymphadenopathy or evidence of metastasis.\par -Since 2/18/2021, new right-sided mild pleural effusion and left-sided trace pleural effusion.\par \par He established care with Dr. Garnica on 3/22/21, at which time plan was for brain MRI, EBUS, and referral here to discuss radiation therapy.\par \par EBUS on 3/26/21 showed the following pathology: \par -Poorly differentiated carcinoma. Tumor cells are negative for TTF1, Napsin A and P40 stains. No change in final diagnosis.\par -NGS lung panel and PDL1 pending.\par \par Brain MRI w/wo bill done 3/31/21 showed an approximately 8 mm colloid cyst, and no intracranial metastases.\par \par Today, he notes he continues to feel discomfort in his chest. He also reports decreased appetite that started in January/ February, and he has lost approx 10 pounds. He also reports intermittent cough, but denies hemoptysis, SOB, fever, chills, fatigue, or any further complaints. He denies history of radiation. \par \par He denies any know family history of cancer. \par \par Patient is a former smoker (50 pack years, quit Feb 2021). He lives in [place] with his spouse. He gets to medical appointments via taxi.

## 2021-06-16 NOTE — REVIEW OF SYSTEMS
[Esophagitis: Grade 0] : Esophagitis: Grade 0 [Nausea: Grade 0] : Nausea: Grade 0 [Fatigue: Grade 1 - Fatigue relieved by rest] : Fatigue: Grade 1 - Fatigue relieved by rest [Cough: Grade 0] : Cough: Grade 0 [Dyspnea: Grade 0] : Dyspnea: Grade 0 [Hoarseness: Grade 0] : Hoarseness: Grade 0

## 2021-06-20 NOTE — HISTORY OF PRESENT ILLNESS
[FreeTextEntry1] : 6/9/21- OTV- Mr. Rosen has completed 4800 cGy/ 6000 cGy to the right lung. Today, he notes he continues to feel very well. He denies pain/ burning with swallowing and is eating well. Continues to take Gabepentin and Lidocaine viscous. He has gained weight since last week. Denies cough or hemoptysis. SOB with exertion is stable. Reports good energy. Denies skin irritation. Chemo as per Dr. Garnica.\par \par 6/2/21- OTV- Mr. Rosen has completed 3800 cGy/ 6000 cGy to the right lung. Today, he notes he feels great, no longer has burning/ pain with swallowing. He reports he is eating well, no decrease in PO intake, weight stable. Denies cough or hemoptysis. SOB with exertion is stable. Reports good energy. Denies skin irritation. Chemo as per Dr. Garnica.\par \par 5/26/21- OTV- Mr. Rosen has completed 3200 cGy/ 6000 cGy to the right lung. He presented to clinic on Monday due to c/o "burning" with swallowing and decreased PO intake. He was started on Lidocaine viscous, and advised to eat soft foods. Yesterday he was started on Omeprazole and Gabapentin (300 mg each night currently, to be uptitrated). Today, he notes the burning has improved and he is eating well. Weight is up two pounds since last week. Continues to note a cough at times, clear sputum. Denies hemoptysis. He also notes SOB with exertion that is stable. Reports good energy. Denies skin irritation. Chemo as per Dr. Garnica. Blood work results from 5/21 with Dr. Garnica reviewed.  He notes he feels very well and will continue chemoRT as planned.\par \par 5/19/21 - OTV- Mr. Rosen has completed 2200 cGy/ 6000 cGy to the right lung. Today, he states he is feeling OK, and trying to eat and drink as usual. He states he has a dry non-productive cough, and he is not taking any OTC cough suppressants for this. He denies chest pain, or SOB , but does experience dyspnea on exertion when climbing steps and continues with RT as prescribed. SW contacted patient last week regarding patient's concerns about transportation and insurance coverage for Ensure. Weight loss seems to have stabilized and patient has not had weight loss this week\par \par 5/12/21 - OTV- Mr. Rosen has completed 1200 cGy/ 6000 cGy to the right lung.  Today, he notes he is stable since last week. Continues to note a cough at times, clear sputum. Denies hemoptysis. He also notes SOB with exertion and is having difficulty climbing stairs (he takes the subway here). His appetite is good, but he has lost approx 9 pounds in the past month. He was previously drinking Ensure, but his insurance does not cover it. He denies dysphagia. Denies skin irritation. He is scheduled to follow up with Dr. Garnica tomorrow and resumed chemotherapy.  will continue chemoRT as planned.\par \par 5/5/21 OTV - \par Mr Rosen presents today for his first OTV. HE has received 2/30 fractions for 400 cGy / 600 cGy. Patient states he feels well overall. Denies chest pain or pressure. Patient c/o prductive couhg, white sputum with some difficulty in expectorating the sputum. Denies blood tinged. Appetite good. No other complaints noted. \par __________________\par INITIAL EVAL (4/9/21)\par Mr. Chalino Rosen is a 71 year old male, former heavy smoker (quit Feb 2021), referred by Dr. Garnica for consideration of radiation therapy for a Stage III (A vs. B) (cT4, cNx, M0) right sided NSCLC.  His PMH is notable for prostate cancer, s/p radical prostatectomy in 2016 (managed by Dr. De La Cruz). \par \par Patient presented to [facility] ED on 2/18/21 for chest discomfort.\par \par CT Chest done 2/18/21 revealed the following: \par -Lobular soft tissue mediastinal mass with areas of low density suggesting areas of necrosis measuring 9.6 x 6.4 x 14.3 cm, and encasing the jose, right mainstem bronchus and abutting mediastinal vessels. No axillary, left hilar, or lower cervical adenopathy.\par -Small to moderate pericardial effusion.\par -Mildly dilated aortic root 4.0 cm.\par -Mildly dilated main pulmonary artery which can be seen in pulmonary artery hypertension.\par \par He underwent EBUS on 2/19/21; pathology showed poorly differentiated carcinoma.\par \par PET/CT done 2/21/21 showed the following: \par -Re-demonstrated lobular soft tissue mediastinal mass which extends into the azygoesophageal recess measuring 0.6 x 6.4 x 14.3 cm with SUV 21.3. This abnormality is likely to be a malignancy.\par -No lymphadenopathy or evidence of metastasis.\par -Since 2/18/2021, new right-sided mild pleural effusion and left-sided trace pleural effusion.\par \par He established care with Dr. Garnica on 3/22/21, at which time plan was for brain MRI, EBUS, and referral here to discuss radiation therapy.\par \par EBUS on 3/26/21 showed the following pathology: \par -Poorly differentiated carcinoma. Tumor cells are negative for TTF1, Napsin A and P40 stains. No change in final diagnosis.\par -NGS lung panel and PDL1 pending.\par \par Brain MRI w/wo bill done 3/31/21 showed an approximately 8 mm colloid cyst, and no intracranial metastases.\par \par Today, he notes he continues to feel discomfort in his chest. He also reports decreased appetite that started in January/ February, and he has lost approx 10 pounds. He also reports intermittent cough, but denies hemoptysis, SOB, fever, chills, fatigue, or any further complaints. He denies history of radiation. \par \par He denies any know family history of cancer. \par \par Patient is a former smoker (50 pack years, quit Feb 2021). He lives in [place] with his spouse. He gets to medical appointments via taxi.

## 2021-06-20 NOTE — DISEASE MANAGEMENT
[Clinical] : TNM Stage: c [IIIA] : IIIA [TTNM] : 4 [NTNM] : x [MTNM] : 0 [de-identified] : 4800 cGy [de-identified] : 6000 cGy [de-identified] : right lung

## 2021-06-20 NOTE — REVIEW OF SYSTEMS
[Esophagitis: Grade 2 - Symptomatic; altered eating/swallowing;  oral supplements indicated] : Esophagitis: Grade 2 - Symptomatic; altered eating/swallowing;  oral supplements indicated [Fatigue: Grade 0] : Fatigue: Grade 0 [Dyspnea: Grade 1 - Shortness of breath with moderate exertion] : Dyspnea: Grade 1 - Shortness of breath with moderate exertion [Recent Change In Weight] : ~T recent weight change [Cough] : cough [Negative] : Heme/Lymph [Fever] : no fever [Chills] : no chills [Night Sweats] : no night sweats [Fatigue] : no fatigue [Wheezing] : no wheezing [Shortness Of Breath] : no shortness of breath [FreeTextEntry2] : Weight loss (approx 10 pounds) [FreeTextEntry5] : Chest discomfort. [FreeTextEntry7] : +decreased appetite

## 2021-07-06 ENCOUNTER — APPOINTMENT (OUTPATIENT)
Dept: CT IMAGING | Facility: HOSPITAL | Age: 71
End: 2021-07-06
Payer: MEDICARE

## 2021-07-06 ENCOUNTER — OUTPATIENT (OUTPATIENT)
Dept: OUTPATIENT SERVICES | Facility: HOSPITAL | Age: 71
LOS: 1 days | End: 2021-07-06
Payer: MEDICARE

## 2021-07-06 DIAGNOSIS — Z90.79 ACQUIRED ABSENCE OF OTHER GENITAL ORGAN(S): Chronic | ICD-10-CM

## 2021-07-06 PROCEDURE — 71260 CT THORAX DX C+: CPT | Mod: 26

## 2021-07-06 PROCEDURE — 71260 CT THORAX DX C+: CPT

## 2021-07-06 PROCEDURE — 74177 CT ABD & PELVIS W/CONTRAST: CPT | Mod: 26

## 2021-07-06 PROCEDURE — 74177 CT ABD & PELVIS W/CONTRAST: CPT

## 2021-07-22 ENCOUNTER — APPOINTMENT (OUTPATIENT)
Dept: RADIATION ONCOLOGY | Facility: CLINIC | Age: 71
End: 2021-07-22
Payer: MEDICARE

## 2021-07-22 ENCOUNTER — NON-APPOINTMENT (OUTPATIENT)
Age: 71
End: 2021-07-22

## 2021-07-22 VITALS
SYSTOLIC BLOOD PRESSURE: 124 MMHG | TEMPERATURE: 98.2 F | WEIGHT: 194 LBS | HEART RATE: 85 BPM | RESPIRATION RATE: 18 BRPM | OXYGEN SATURATION: 99 % | BODY MASS INDEX: 29.5 KG/M2 | DIASTOLIC BLOOD PRESSURE: 84 MMHG

## 2021-07-22 PROCEDURE — 99024 POSTOP FOLLOW-UP VISIT: CPT

## 2021-07-25 NOTE — DISEASE MANAGEMENT
[Clinical] : TNM Stage: c [IIIA] : IIIA [TTNM] : 4 [NTNM] : x [MTNM] : 0 [de-identified] : 6000 cGy [de-identified] : 6000 cGy [de-identified] : right lung

## 2021-07-25 NOTE — HISTORY OF PRESENT ILLNESS
[FreeTextEntry1] : 07/22/21- PTE-  Mr Rosen presents today for follow up and overall feels well, denies fatigue. Denies cough, no sign of SOB or dyspnea on exertion. Pt states his previous burning sensations in throat have subsided completely. Chest skin appears clean dry and intact and mid back skin appears clean, dry and intact with small slight area of hyperpigmentation, pt states it is gradually improving. States his GI and  status is within normal limits and has no abnormalities.  He notes he had a CT scan recently, though we don't have the report for this--and per patient, it showed excellent radiographic response--and is due to meet with Dr. Garnica in the upcoming two weeks.\par \par 6/16/21 OTV Mr Rosen is feeling well overall today. Denies cough, states is has completely resolved. Denies shortness of breath or dyspnea on exertion. He has PTE appointment scheduled for 7/22 and has been given discharge instruction packet and all questions answered. PAtient was also seen by nutritionist today. \par \par 6/9/21- OTV- Mr. Rosen has completed 4800 cGy/ 6000 cGy to the right lung. Today, he notes he continues to feel very well. He denies pain/ burning with swallowing and is eating well. Continues to take Gabepentin and Lidocaine viscous. He has gained weight since last week. Denies cough or hemoptysis. SOB with exertion is stable. Reports good energy. Denies skin irritation. Chemo as per Dr. Garnica.\par \par 6/2/21- OTV- Mr. Rosen has completed 3800 cGy/ 6000 cGy to the right lung. Today, he notes he feels great, no longer has burning/ pain with swallowing. He reports he is eating well, no decrease in PO intake, weight stable. Denies cough or hemoptysis. SOB with exertion is stable. Reports good energy. Denies skin irritation. Chemo as per Dr. Garnica.\par \par 5/26/21- OTV- Mr. Rosen has completed 3200 cGy/ 6000 cGy to the right lung. He presented to clinic on Monday due to c/o "burning" with swallowing and decreased PO intake. He was started on Lidocaine viscous, and advised to eat soft foods. Yesterday he was started on Omeprazole and Gabapentin (300 mg each night currently, to be uptitrated). Today, he notes the burning has improved and he is eating well. Weight is up two pounds since last week. Continues to note a cough at times, clear sputum. Denies hemoptysis. He also notes SOB with exertion that is stable. Reports good energy. Denies skin irritation. Chemo as per Dr. Garnica. Blood work results from 5/21 with Dr. Garnica reviewed.  He notes he feels very well and will continue chemoRT as planned.\par \par 5/19/21 - OTV- Mr. Rosne has completed 2200 cGy/ 6000 cGy to the right lung. Today, he states he is feeling OK, and trying to eat and drink as usual. He states he has a dry non-productive cough, and he is not taking any OTC cough suppressants for this. He denies chest pain, or SOB , but does experience dyspnea on exertion when climbing steps and continues with RT as prescribed. SW contacted patient last week regarding patient's concerns about transportation and insurance coverage for Ensure. Weight loss seems to have stabilized and patient has not had weight loss this week\par \par 5/12/21 - OTV- Mr. Rosen has completed 1200 cGy/ 6000 cGy to the right lung.  Today, he notes he is stable since last week. Continues to note a cough at times, clear sputum. Denies hemoptysis. He also notes SOB with exertion and is having difficulty climbing stairs (he takes the subway here). His appetite is good, but he has lost approx 9 pounds in the past month. He was previously drinking Ensure, but his insurance does not cover it. He denies dysphagia. Denies skin irritation. He is scheduled to follow up with Dr. Garnica tomorrow and resumed chemotherapy.  will continue chemoRT as planned.\par \par 5/5/21 OTV - \par Mr Rosen presents today for his first OTV. HE has received 2/30 fractions for 400 cGy / 600 cGy. Patient states he feels well overall. Denies chest pain or pressure. Patient c/o prductive couhg, white sputum with some difficulty in expectorating the sputum. Denies blood tinged. Appetite good. No other complaints noted. \par __________________\par INITIAL EVAL (4/9/21)\par Mr. Chalino Rosen is a 71 year old male, former heavy smoker (quit Feb 2021), referred by Dr. Garnica for consideration of radiation therapy for a Stage III (A vs. B) (cT4, cNx, M0) right sided NSCLC.  His PMH is notable for prostate cancer, s/p radical prostatectomy in 2016 (managed by Dr. De La Cruz). \par \par Patient presented to [facility] ED on 2/18/21 for chest discomfort.\par \par CT Chest done 2/18/21 revealed the following: \par -Lobular soft tissue mediastinal mass with areas of low density suggesting areas of necrosis measuring 9.6 x 6.4 x 14.3 cm, and encasing the jose, right mainstem bronchus and abutting mediastinal vessels. No axillary, left hilar, or lower cervical adenopathy.\par -Small to moderate pericardial effusion.\par -Mildly dilated aortic root 4.0 cm.\par -Mildly dilated main pulmonary artery which can be seen in pulmonary artery hypertension.\par \par He underwent EBUS on 2/19/21; pathology showed poorly differentiated carcinoma.\par \par PET/CT done 2/21/21 showed the following: \par -Re-demonstrated lobular soft tissue mediastinal mass which extends into the azygoesophageal recess measuring 0.6 x 6.4 x 14.3 cm with SUV 21.3. This abnormality is likely to be a malignancy.\par -No lymphadenopathy or evidence of metastasis.\par -Since 2/18/2021, new right-sided mild pleural effusion and left-sided trace pleural effusion.\par \par He established care with Dr. Garnica on 3/22/21, at which time plan was for brain MRI, EBUS, and referral here to discuss radiation therapy.\par \par EBUS on 3/26/21 showed the following pathology: \par -Poorly differentiated carcinoma. Tumor cells are negative for TTF1, Napsin A and P40 stains. No change in final diagnosis.\par -NGS lung panel and PDL1 pending.\par \par Brain MRI w/wo bill done 3/31/21 showed an approximately 8 mm colloid cyst, and no intracranial metastases.\par \par Today, he notes he continues to feel discomfort in his chest. He also reports decreased appetite that started in January/ February, and he has lost approx 10 pounds. He also reports intermittent cough, but denies hemoptysis, SOB, fever, chills, fatigue, or any further complaints. He denies history of radiation. \par \par He denies any know family history of cancer. \par \par Patient is a former smoker (50 pack years, quit Feb 2021). He lives in [place] with his spouse. He gets to medical appointments via taxi.

## 2021-07-25 NOTE — PHYSICAL EXAM
[Normal] : normal external genitalia without lesions and no testicular masses [Normal] : oriented to person, place and time, the affect was normal, the mood was normal and not anxious [de-identified] : small area of hyperpigmentation to mid back, pt states it is improving

## 2021-07-25 NOTE — REVIEW OF SYSTEMS
[Esophagitis: Grade 0] : Esophagitis: Grade 0 [Nausea: Grade 0] : Nausea: Grade 0 [Cough: Grade 0] : Cough: Grade 0 [Dyspnea: Grade 0] : Dyspnea: Grade 0 [Hoarseness: Grade 0] : Hoarseness: Grade 0 [Fatigue: Grade 0] : Fatigue: Grade 0 [Negative] : Allergic/Immunologic [de-identified] : slight hyperpigmentation on mid back, pt states it is improving

## 2021-09-03 ENCOUNTER — APPOINTMENT (OUTPATIENT)
Dept: RADIATION ONCOLOGY | Facility: CLINIC | Age: 71
End: 2021-09-03

## 2021-09-09 ENCOUNTER — APPOINTMENT (OUTPATIENT)
Dept: RADIATION ONCOLOGY | Facility: CLINIC | Age: 71
End: 2021-09-09

## 2021-09-23 ENCOUNTER — APPOINTMENT (OUTPATIENT)
Dept: RADIATION ONCOLOGY | Facility: CLINIC | Age: 71
End: 2021-09-23

## 2021-09-24 NOTE — HISTORY OF PRESENT ILLNESS
[FreeTextEntry1] : Mr. Chalino Rosen a 72yo M w/ history of DM, HTN, HLD, prostate cancer s/p prostatectomy in 2016 and recently diagnosed NSCLC s/p chemoRT, 60Gy w/ Dr. Lee completed 6/17/21. Pt subsequently found w/ single brain lesion s/p resection and now presents to discuss radiation therapy.\par \par Brief Oncological History:\par Presented to [facility] w/ weight loss.\par \par CT chest 2/18/2021 showed \par 1.  Large mass/shelli aggregate encasing the jose, right mainstem bronchus and abutting mediastinal vessels. Recommend metabolic and histologic assessment for further characterization.\par 2.  Small to moderate pericardial effusion.\par \par Mediastinal mass biopsy on 2/19/2021 showed poorly differentiated carcinoma. Repeat EBUS 3/26/2021 showed poorly differentiated carcinoma. TP53 mutation positive. PDL-1 70-75% positive.\par \par PET scan 2/21/2021 showed 1.  Redemonstrated lobular soft tissue mediastinal mass which extends into the azygoesophageal recess measuring 0.6 x 6.4 x 14.3 cm (AP by TV by CC dimensions) with SUV 21.3. This abnormality is likely to be a malignancy.\par 2.  No lymphadenopathy or evidence of metastasis.\par 3.  Since 2/18/2021, new right-sided mild pleural effusion and left-sided trace pleural effusion.\par \par He completed right sided lung radiation for a total of 6000 cgy from 5/5-6/17/2021 with Dr. Lee in combination with carbo/taxol with Dr. Garnica.\par \par Brain MRI 3/31 negative for metastases. \par He completed chemo/RT, right lung, 30 fractions, 6000 cgy 5/5-6/17/2021 in combination with carbo/taxol. \par \par CT Scans 7/2021 showed Modest decrease in size of thoracic mass, a conglomerate of right upper lobe lung mass and adjacent mediastinal lymphadenopathy. Trace right pleural effusion has decreased since prior study. Moderate pericardial effusion persists. No evidence of new metastatic disease.\par \par Patient presented to [facility] on 8/17/2021 with complaints of headache for 4 days. Homonymous hemianopsia found. MRI showed a left occipital hemorrhagic mass with vasogenic edema with 3.5cm left occipital hemmorrhagic mass. \par \par Patient underwent excision on 8/23, pathology positive for metastatic carcinoma compatible with lung primary. Post operative MRI showed blood products, no definitive residual disease.

## 2021-10-21 ENCOUNTER — NON-APPOINTMENT (OUTPATIENT)
Age: 71
End: 2021-10-21

## 2022-06-12 NOTE — ED ADULT TRIAGE NOTE - CHIEF COMPLAINT QUOTE
Pt changed, new bed linen; given 1 pudding      Gianluca Shah RN  06/12/22 1941 pt c/o mid sternal chest pain that started today, pt given ASA 324mg by EMS. denies cardiac hx.

## 2023-11-20 NOTE — PATIENT PROFILE ADULT - DOES PATIENT HAVE ADVANCE DIRECTIVE
No 15 y/o male with RIGHT vasitis, epididymitis    Recommend  - D/C home with 10 days of antibiotics  - Warm soaks, scrotal support  - Follow up with his PCP     Case discussed with Dr. More

## 2024-09-12 NOTE — DIETITIAN INITIAL EVALUATION ADULT. - CALCULATED FROM (G/KG)
Medication: rosuvastatin  Last office visit date: 5-3-24  Next ov 11-8-24  Medication Refill Protocol Failed.  Medication Refill Protocol Failed. Courtesy Refill provided after Lipid Panel or Direct LDL resulted within last 15 months -- IF CRITERIA FAILED REFER TO PROTOCOL DETAILS per protocol guidelines. Writer confirmed, courtesy refill was not a duplicate.   
69.8